# Patient Record
Sex: FEMALE | Race: WHITE | NOT HISPANIC OR LATINO | Employment: UNEMPLOYED | URBAN - METROPOLITAN AREA
[De-identification: names, ages, dates, MRNs, and addresses within clinical notes are randomized per-mention and may not be internally consistent; named-entity substitution may affect disease eponyms.]

---

## 2017-02-15 ENCOUNTER — TELEPHONE (OUTPATIENT)
Dept: FAMILY MEDICINE | Facility: CLINIC | Age: 27
End: 2017-02-15

## 2017-02-15 NOTE — TELEPHONE ENCOUNTER
Panel Management Review      Patient has the following on her problem list:     Depression / Dysthymia review  PHQ-9 SCORE 11/18/2016 12/23/2016   Total Score 22 14      Patient is due for:  None      Composite cancer screening  Chart review shows that this patient is due/due soon for the following None  Summary:    Patient is due/failing the following:   Tetanus Immunization    Action needed:   Patient needs nurse only appointment.    Type of outreach:    Phone, left message for patient to call back.     Questions for provider review:    None                                                                                                                                    Do Alaniz MA     Chart routed to Care Team .

## 2017-03-22 ENCOUNTER — TELEPHONE (OUTPATIENT)
Dept: FAMILY MEDICINE | Facility: CLINIC | Age: 27
End: 2017-03-22

## 2017-03-22 DIAGNOSIS — Z53.9 ERRONEOUS ENCOUNTER--DISREGARD: Primary | ICD-10-CM

## 2017-06-29 ENCOUNTER — OFFICE VISIT (OUTPATIENT)
Dept: FAMILY MEDICINE | Facility: CLINIC | Age: 27
End: 2017-06-29
Payer: COMMERCIAL

## 2017-06-29 VITALS
HEART RATE: 61 BPM | BODY MASS INDEX: 34.98 KG/M2 | OXYGEN SATURATION: 98 % | TEMPERATURE: 97.5 F | DIASTOLIC BLOOD PRESSURE: 60 MMHG | WEIGHT: 210.2 LBS | SYSTOLIC BLOOD PRESSURE: 108 MMHG

## 2017-06-29 DIAGNOSIS — Z23 NEED FOR PROPHYLACTIC VACCINATION WITH TETANUS-DIPHTHERIA (TD): ICD-10-CM

## 2017-06-29 DIAGNOSIS — F33.2 SEVERE EPISODE OF RECURRENT MAJOR DEPRESSIVE DISORDER, WITHOUT PSYCHOTIC FEATURES (H): ICD-10-CM

## 2017-06-29 DIAGNOSIS — N30.00 ACUTE CYSTITIS WITHOUT HEMATURIA: Primary | ICD-10-CM

## 2017-06-29 DIAGNOSIS — Z23 NEED FOR HPV VACCINE: ICD-10-CM

## 2017-06-29 DIAGNOSIS — F41.9 ANXIETY: ICD-10-CM

## 2017-06-29 PROBLEM — F33.9 RECURRENT MAJOR DEPRESSIVE DISORDER (H): Status: ACTIVE | Noted: 2017-06-29

## 2017-06-29 LAB
BACTERIA #/AREA URNS HPF: ABNORMAL /HPF
MUCOUS THREADS #/AREA URNS LPF: PRESENT /LPF
NON-SQ EPI CELLS #/AREA URNS LPF: ABNORMAL /LPF
RBC #/AREA URNS AUTO: ABNORMAL /HPF (ref 0–2)
URNS CMNT MICRO: ABNORMAL
WBC #/AREA URNS AUTO: ABNORMAL /HPF (ref 0–2)

## 2017-06-29 PROCEDURE — 90715 TDAP VACCINE 7 YRS/> IM: CPT | Performed by: FAMILY MEDICINE

## 2017-06-29 PROCEDURE — 81015 MICROSCOPIC EXAM OF URINE: CPT | Performed by: FAMILY MEDICINE

## 2017-06-29 PROCEDURE — 90472 IMMUNIZATION ADMIN EACH ADD: CPT | Performed by: FAMILY MEDICINE

## 2017-06-29 PROCEDURE — 87086 URINE CULTURE/COLONY COUNT: CPT | Performed by: FAMILY MEDICINE

## 2017-06-29 PROCEDURE — 90651 9VHPV VACCINE 2/3 DOSE IM: CPT | Performed by: FAMILY MEDICINE

## 2017-06-29 PROCEDURE — 90471 IMMUNIZATION ADMIN: CPT | Performed by: FAMILY MEDICINE

## 2017-06-29 PROCEDURE — 99214 OFFICE O/P EST MOD 30 MIN: CPT | Mod: 25 | Performed by: FAMILY MEDICINE

## 2017-06-29 RX ORDER — BUPROPION HYDROCHLORIDE 100 MG/1
100 TABLET, EXTENDED RELEASE ORAL EVERY MORNING
Qty: 30 TABLET | Refills: 1 | Status: SHIPPED | OUTPATIENT
Start: 2017-06-29 | End: 2023-01-27

## 2017-06-29 RX ORDER — SULFAMETHOXAZOLE/TRIMETHOPRIM 800-160 MG
1 TABLET ORAL 2 TIMES DAILY
Qty: 6 TABLET | Refills: 0 | Status: SHIPPED | OUTPATIENT
Start: 2017-06-29 | End: 2017-07-02

## 2017-06-29 ASSESSMENT — ANXIETY QUESTIONNAIRES
1. FEELING NERVOUS, ANXIOUS, OR ON EDGE: NEARLY EVERY DAY
IF YOU CHECKED OFF ANY PROBLEMS ON THIS QUESTIONNAIRE, HOW DIFFICULT HAVE THESE PROBLEMS MADE IT FOR YOU TO DO YOUR WORK, TAKE CARE OF THINGS AT HOME, OR GET ALONG WITH OTHER PEOPLE: EXTREMELY DIFFICULT
2. NOT BEING ABLE TO STOP OR CONTROL WORRYING: NEARLY EVERY DAY
GAD7 TOTAL SCORE: 21
3. WORRYING TOO MUCH ABOUT DIFFERENT THINGS: NEARLY EVERY DAY
7. FEELING AFRAID AS IF SOMETHING AWFUL MIGHT HAPPEN: NEARLY EVERY DAY
5. BEING SO RESTLESS THAT IT IS HARD TO SIT STILL: NEARLY EVERY DAY
6. BECOMING EASILY ANNOYED OR IRRITABLE: NEARLY EVERY DAY

## 2017-06-29 ASSESSMENT — PATIENT HEALTH QUESTIONNAIRE - PHQ9: 5. POOR APPETITE OR OVEREATING: NEARLY EVERY DAY

## 2017-06-29 NOTE — MR AVS SNAPSHOT
"              After Visit Summary   6/29/2017    Veronique Oliva    MRN: [id number]           Patient Information     Date Of Birth          1990        Visit Information        Provider Department      6/29/2017 5:20 PM Jace Graf MD [facility]        Today's Diagnoses     Acute cystitis without hematuria    -  1    Severe episode of recurrent major depressive disorder, without psychotic features (H)        Anxiety        Need for HPV vaccine        Need for prophylactic vaccination with tetanus-diphtheria (TD)           Follow-ups after your visit        Follow-up notes from your care team     Return in about 3 weeks (around 7/20/2017) for recheck depression, recheck medications.      Who to contact     If you have questions or need follow up information about today's clinic visit or your schedule please contact [facility] directly at [phone].  Normal or non-critical lab and imaging results will be communicated to you by MyChart, letter or phone within 4 business days after the clinic has received the results. If you do not hear from us within 7 days, please contact the clinic through MyChart or phone. If you have a critical or abnormal lab result, we will notify you by phone as soon as possible.  Submit refill requests through [facility] or call your pharmacy and they will forward the refill request to us. Please allow 3 business days for your refill to be completed.          Additional Information About Your Visit        MyChart Information     [facility] lets you send messages to your doctor, view your test results, renew your prescriptions, schedule appointments and more. To sign up, go to [email or web address] . Click on \"Log in\" on the left side of the screen, which will take you to the Welcome page. Then click on \"Sign up Now\" on the right side of the page.     You will be asked to enter the access code listed below, as well as some personal " information. Please follow the directions to create your username and password.     Your access code is: [email or web address]  Expires: 2017  6:16 PM     Your access code will  in 90 days. If you need help or a new code, please call your [place] clinic or [phone].        Care EveryWhere ID     This is your Care EveryWhere ID. This could be used by other organizations to access your [place] medical records  [id number]        Your Vitals Were     Pulse Temperature Pulse Oximetry BMI (Body Mass Index)          61 97.5  F (36.4  C) (Oral) 98% 34.98 kg/m2         Blood Pressure from Last 3 Encounters:   17 108/60   16 106/72   16 118/78    Weight from Last 3 Encounters:   17 210 lb 3.2 oz (95.3 kg)   16 229 lb (103.9 kg)   16 230 lb 9.6 oz (104.6 kg)              We Performed the Following     DEPRESSION ACTION PLAN (DAP)     HC HPV VAC 9V 3 DOSE IM     TDAP VACCINE (ADACEL)     Urine Culture Aerobic Bacterial     Urine Microscopic          Today's Medication Changes          These changes are accurate as of: 17  6:16 PM.  If you have any questions, ask your nurse or doctor.               Start taking these medicines.        Dose/Directions    buPROPion 100 MG 12 hr tablet   Commonly known as:  WELLBUTRIN SR   Used for:  Anxiety   Started by:  Jace Graf MD        Dose:  100 mg   Take 1 tablet (100 mg) by mouth every morning   Quantity:  30 tablet   Refills:  1       sulfamethoxazole-trimethoprim 800-160 MG per tablet   Commonly known as:  BACTRIM DS/SEPTRA DS   Used for:  Acute cystitis without hematuria   Started by:  Jace Graf MD        Dose:  1 tablet   Take 1 tablet by mouth 2 times daily for 3 days for urinary infection.   Quantity:  6 tablet   Refills:  0         These medicines have changed or have updated prescriptions.        Dose/Directions    sertraline 50 MG tablet   Commonly known as:  ZOLOFT   This may have changed:    - how much to  take  - how to take this  - when to take this  - additional instructions   Used for:  Anxiety, Severe episode of recurrent major depressive disorder, without psychotic features (H)   Changed by:  Jace Graf MD        Take 1/2 tab (25 mg) for 1 week, then 1 tab (50 mg) for 1-2 weeks, then 2 tabs (100 mg) daily, for depression and anxiety prevention.   Quantity:  60 tablet   Refills:  1         Stop taking these medicines if you haven't already. Please contact your care team if you have questions.     BUSPAR PO   Stopped by:  Jace Graf MD                Where to get your medicines      These medications were sent to [place], [place] - [facility] [address] [facility]  [address] [facility] [address]     Phone:  [phone]     buPROPion 100 MG 12 hr tablet    sertraline 50 MG tablet    sulfamethoxazole-trimethoprim 800-160 MG per tablet                Primary Care Provider Fax #    [place] [phone] [address] [facility] [address]        Equal Access to Services     [facility]: Hadii aaron ku hadasho Soomaali, waaxda luqadaha, qaybta kaalmada adeegyababar, waxhugo malone . So [facility] [phone].    ATENCIÓN: Si habla español, tiene a mayer disposición servicios gratuitos de asistencia lingüística. Luxame al [phone].    We comply with applicable federal civil rights laws and [place] laws. We do not discriminate on the basis of race, color, national origin, age, disability sex, sexual orientation or gender identity.            Thank you!     Thank you for choosing [facility]  for your care. Our goal is always to provide you with excellent care. Hearing back from our patients is one way we can continue to improve our services. Please take a few minutes to complete the written survey that you may receive in the mail after your visit with us. Thank you!             Your Updated Medication List -  Protect others around you: Learn how to safely use, store and throw away your medicines at www.disposemymeds.org.          This list is accurate as of: 6/29/17  6:16 PM.  Always use your most recent med list.                   Brand Name Dispense Instructions for use Diagnosis    buPROPion 100 MG 12 hr tablet    WELLBUTRIN SR    30 tablet    Take 1 tablet (100 mg) by mouth every morning    Anxiety       sertraline 50 MG tablet    ZOLOFT    60 tablet    Take 1/2 tab (25 mg) for 1 week, then 1 tab (50 mg) for 1-2 weeks, then 2 tabs (100 mg) daily, for depression and anxiety prevention.    Anxiety, Severe episode of recurrent major depressive disorder, without psychotic features (H)       sulfamethoxazole-trimethoprim 800-160 MG per tablet    BACTRIM DS/SEPTRA DS    6 tablet    Take 1 tablet by mouth 2 times daily for 3 days for urinary infection.    Acute cystitis without hematuria

## 2017-06-29 NOTE — NURSING NOTE
"Chief Complaint   Patient presents with     RECHECK     anxiety, depression     Urinary Problem       Initial /60 (BP Location: Left arm, Patient Position: Chair, Cuff Size: Adult Large)  Pulse 61  Temp 97.5  F (36.4  C) (Oral)  Wt 210 lb 3.2 oz (95.3 kg)  SpO2 98%  BMI 34.98 kg/m2 Estimated body mass index is 34.98 kg/(m^2) as calculated from the following:    Height as of 12/23/16: 5' 5\" (1.651 m).    Weight as of this encounter: 210 lb 3.2 oz (95.3 kg).  Medication Reconciliation: complete   Ira Sanches CMA      "

## 2017-06-29 NOTE — PROGRESS NOTES
SUBJECTIVE:                                                    Veronique Oliva is a 26 year old female who presents to clinic today for the following health issues:  She is accompanied by her close friend (ex-boyfriend).   Depression and Anxiety Follow-Up    Status since last visit: Worsened     Other associated symptoms: clinching jaw, has to move hands, bites fingernails    Complicating factors:     Significant life event: 3 people she is close to  in last few months (2 of OD, uncle of cancer)     Current substance abuse: None    PHQ-9 SCORE 2016   Total Score 22 14     MARCO ANTONIO-7 SCORE 2016   Total Score 19 15       PHQ-9  English  PHQ-9   Any Language  GAD7    Amount of exercise or physical activity: Walks a lot at work    Problems taking medications regularly: No    Medication side effects: none    Diet: regular (no restrictions)  Was not able to take Sertraline and Buspirone for 3 months- was out of prescription. Buspirone did not help with symptoms.    URINARY TRACT SYMPTOMS  Onset: 1 week ago    Description:   Painful urination (Dysuria): YES- constant pain not just when  Blood in urine (Hematuria): no   Delay in urine (Hesitency): YES    Intensity: moderate    Progression of Symptoms:  worsening    Accompanying Signs & Symptoms:  Fever/chills: YES- yesterday had fever, chills yesterday  Flank pain YES  Nausea and vomiting: YES- nausea  Any vaginal symptoms: vaginal discharge- orange not sure if it's more than usual  Abdominal/Pelvic Pain: YES    History:   History of frequent UTI's: YES  History of kidney stones: no   Sexually Active: YES  Possibility of pregnancy: Don't Know  Therapies Tried and outcome: Azo- helps slightly    Patient comes in for possible UTI and to talk about her depression and anxiety. She stopped alcohol and other drugs, but does smoke marijuana.     She states her sertraline helps, but the anxiety meds don't.     Past medical, family, and  social histories, medications, and allergies are reviewed and updated in Epic.     ROS:  C: NEGATIVE for fever, chills, change in weight  E/M: NEGATIVE for ear, mouth and throat problems  R: NEGATIVE for significant cough or SOB  CV: NEGATIVE for chest pain, palpitations or peripheral edema  PSYCHIATRIC: She does have frequent thoughts of death and wanting to die, due to feeling overwhelmed by recent stressors, but has no plans to kill herself; PHQ-9 = 26; MARCO ANTONIO-7 = 21. She states that she is sober except for occasional marijuana use.  ROS otherwise negative    Any history above obtained by the Medical Assistant was reviewed by Dr. Jace Graf MD, and edited when necessary.    This document serves as a record of the services and decisions personally performed and made by Dr. Graf. It was created on his behalf by Urmila Jeong, a trained medical scribe. The creation of this document is based the provider's statements to the medical scribe.  Urmila Jeong June 29, 2017 6:02 PM     OBJECTIVE:                                                    /60 (BP Location: Left arm, Patient Position: Chair, Cuff Size: Adult Large)  Pulse 61  Temp 97.5  F (36.4  C) (Oral)  Wt 95.3 kg (210 lb 3.2 oz)  SpO2 98%  BMI 34.98 kg/m2   Body mass index is 34.98 kg/(m^2).     EXAM:  GENERAL: healthy, alert and no distress  EYES: Eyes grossly normal to inspection, PERRL, EOMI, sclerae white and conjunctivae normal  MS: no gross musculoskeletal defects noted, no edema  SKIN: no suspicious lesions or rashes  NEURO: Normal strength and tone, sensory exam grossly normal, mentation intact, oriented times 3 and cranial nerves 2-12 intact  PSYCH: mentation appears normal, affect normal, she is not suicidal    Diagnostic Test Results:  none      ASSESSMENT/PLAN:                                                      (N30.00) Acute cystitis without hematuria  (primary encounter diagnosis)  Comment: WBC greater than RBC on micro,  with classic symptoms.  Plan: Urine Microscopic,         sulfamethoxazole-trimethoprim (BACTRIM         DS/SEPTRA DS) 800-160 MG per tablet, CANCELED:         UA reflex to Microscopic and Culture        Follow up as needed.     (F33.2) Severe episode of recurrent major depressive disorder, without psychotic features (H)  Comment: She has tolerated sertraline in the past but has not taken more than 50 mg. I think she would do better on a higher dose.  Plan: sertraline (ZOLOFT) 50 MG tablet, DEPRESSION         ACTION PLAN (DAP)        Follow up in 3-4 weeks.     (F41.9) Anxiety  Comment: Patient reports minimal effect of Buspirone, so I will not bother to restart that. Perhaps the bupropion will be helpful.   Plan: sertraline (ZOLOFT) 50 MG tablet, buPROPion         (WELLBUTRIN SR) 100 MG 12 hr tablet        Follow up in 3-4 weeks.     (Z23) Need for HPV vaccine  Comment: #1  Plan: HC HPV VAC 9V 3 DOSE IM        Next dose in 4-8 weeks.     (Z23) Need for prophylactic vaccination with tetanus-diphtheria (TD)  Comment:   Plan: TDAP VACCINE (ADACEL)              The information in this document, created by the medical scribe for me, accurately reflects the services I personally performed and the decisions made by me. I have reviewed and approved this document for accuracy prior to leaving the patient care area. June 29, 2017 6:02 PM   Jace Graf MD

## 2017-06-29 NOTE — LETTER
July 3, 2017      Veronique Joanie Oliva  [address] [place] [address]          Dear Ms. Oliva,    Your urine culture was negative. Complete the medication as prescribed and if you experience new, worsening or persistent symptoms, you should call or return for a recheck.    Please contact the clinic if you have additional questions.  Thank you.    Sincerely,      Jace Graf MD/park    Results for orders placed or performed in visit on 06/29/17   Urine Microscopic   Result Value Ref Range    WBC Urine 10-25 (A) 0 - 2 /HPF    RBC Urine 5-10 (A) 0 - 2 /HPF    Squamous Epithelial /LPF Urine Few FEW /LPF    Bacteria Urine Many (A) NEG /HPF    Mucous Urine Present (A) NEG /LPF    Comment Urine Interfering substances, dipstick not done    Urine Culture Aerobic Bacterial   Result Value Ref Range    Specimen Description Midstream Urine     Culture Micro No growth     Micro Report Status FINAL 06/30/2017

## 2017-06-29 NOTE — LETTER
My Depression Action Plan  Name: Veronique Oliva   Date of Birth 1990  Date: 6/29/2017    My doctor: [facility]   My clinic: [address] [id number] [phone]          GREEN    ZONE   Good Control    What it looks like:     Things are going generally well. You have normal up s and down s. You may even feel depressed from time to time, but bad moods usually last less than a day.   What you need to do:  1. Continue to care for yourself (see self care plan)  2. Check your depression survival kit and update it as needed  3. Follow your physician s recommendations including any medication.  4. Do not stop taking medication unless you consult with your physician first.           YELLOW         ZONE Getting Worse    What it looks like:     Depression is starting to interfere with your life.     It may be hard to get out of bed; you may be starting to isolate yourself from others.    Symptoms of depression are starting to last most all day and this has happened for several days.     You may have suicidal thoughts but they are not constant.   What you need to do:     1. Call your care team, your response to treatment will improve if you keep your care team informed of your progress. Yellow periods are signs an adjustment may need to be made.     2. Continue your self-care, even if you have to fake it!    3. Talk to someone in your support network    4. Open up your depression survival kit           RED    ZONE Medical Alert - Get Help    What it looks like:     Depression is seriously interfering with your life.     You may experience these or other symptoms: You can t get out of bed most days, can t work or engage in other necessary activities, you have trouble taking care of basic hygiene, or basic responsibilities, thoughts of suicide or death that will not go away, self-injurious behavior.     What you need to  do:  1. Call your care team and request a same-day appointment. If they are not available (weekends or after hours) call your local crisis line, emergency room or 911.      Electronically signed by: Jean-Paul Kearns, June 29, 2017    Depression Self Care Plan / Survival Kit    Self-Care for Depression  Here s the deal. Your body and mind are really not as separate as most people think.  What you do and think affects how you feel and how you feel influences what you do and think. This means if you do things that people who feel good do, it will help you feel better.  Sometimes this is all it takes.  There is also a place for medication and therapy depending on how severe your depression is, so be sure to consult with your medical provider and/ or Behavioral Health Consultant if your symptoms are worsening or not improving.     In order to better manage my stress, I will:    Exercise  Get some form of exercise, every day. This will help reduce pain and release endorphins, the  feel good  chemicals in your brain. This is almost as good as taking antidepressants!  This is not the same as joining a gym and then never going! (they count on that by the way ) It can be as simple as just going for a walk or doing some gardening, anything that will get you moving.      Hygiene   Maintain good hygiene (Get out of bed in the morning, Make your bed, Brush your teeth, Take a shower, and Get dressed like you were going to work, even if you are unemployed).  If your clothes don't fit try to get ones that do.    Diet  I will strive to eat foods that are good for me, drink plenty of water, and avoid excessive sugar, caffeine, alcohol, and other mood-altering substances.  Some foods that are helpful in depression are: complex carbohydrates, B vitamins, flaxseed, fish or fish oil, fresh fruits and vegetables.    Psychotherapy  I agree to participate in Individual Therapy (if recommended).    Medication  If prescribed medications, I  agree to take them.  Missing doses can result in serious side effects.  I understand that drinking alcohol, or other illicit drug use, may cause potential side effects.  I will not stop my medication abruptly without first discussing it with my provider.    Staying Connected With Others  I will stay in touch with my friends, family members, and my primary care provider/team.    Use your imagination  Be creative.  We all have a creative side; it doesn t matter if it s oil painting, sand castles, or mud pies! This will also kick up the endorphins.    Witness Beauty  (AKA stop and smell the roses) Take a look outside, even in mid-winter. Notice colors, textures. Watch the squirrels and birds.     Service to others  Be of service to others.  There is always someone else in need.  By helping others we can  get out of ourselves  and remember the really important things.  This also provides opportunities for practicing all the other parts of the program.    Humor  Laugh and be silly!  Adjust your TV habits for less news and crime-drama and more comedy.    Control your stress  Try breathing deep, massage therapy, biofeedback, and meditation. Find time to relax each day.     My support system    Clinic Contact:  Phone number:    Contact 1:  Phone number:    Contact 2:  Phone number:    Christian/:  Phone number:    Therapist:  Phone number:    Local crisis center:    Phone number:    Other community support:  Phone number:

## 2017-06-30 LAB
BACTERIA SPEC CULT: NO GROWTH
MICRO REPORT STATUS: NORMAL
SPECIMEN SOURCE: NORMAL

## 2017-06-30 ASSESSMENT — ANXIETY QUESTIONNAIRES: GAD7 TOTAL SCORE: 21

## 2017-06-30 ASSESSMENT — PATIENT HEALTH QUESTIONNAIRE - PHQ9: SUM OF ALL RESPONSES TO PHQ QUESTIONS 1-9: 26

## 2017-06-30 NOTE — NURSING NOTE
Screening Questionnaire for Adult Immunization    Are you sick today?   No   Do you have allergies to medications, food, a vaccine component or latex?   No   Have you ever had a serious reaction after receiving a vaccination?   No   Do you have a long-term health problem with heart disease, lung disease, asthma, kidney disease, metabolic disease (e.g. diabetes), anemia, or other blood disorder?   No   Do you have cancer, leukemia, HIV/AIDS, or any other immune system problem?   No   In the past 3 months, have you taken medications that affect  your immune system, such as prednisone, other steroids, or anticancer drugs; drugs for the treatment of rheumatoid arthritis, Crohn s disease, or psoriasis; or have you had radiation treatments?   No   Have you had a seizure, or a brain or other nervous system problem?   No   During the past year, have you received a transfusion of blood or blood     products, or been given immune (gamma) globulin or antiviral drug?   No   For women: Are you pregnant or is there a chance you could become        pregnant during the next month?   No   Have you received any vaccinations in the past 4 weeks?   No     Immunization questionnaire answers were all negative.      [facility] does apply for the following reason:  Insured: Has insurance that covers the cost of all vaccines (Not [facility] elligible because insurance already covers all vaccines)    Per orders of Dr. Graf , injection of Tdap and HPV given by Tamica Butler. Patient instructed to remain in clinic for 20 minutes afterwards, and to report any adverse reaction to me immediately.       Screening performed by Tamica Butler on 6/29/2017 at 7:19 PM.

## 2017-08-01 ENCOUNTER — TRANSFERRED RECORDS (OUTPATIENT)
Dept: HEALTH INFORMATION MANAGEMENT | Facility: CLINIC | Age: 27
End: 2017-08-01

## 2017-08-03 ENCOUNTER — OFFICE VISIT (OUTPATIENT)
Dept: FAMILY MEDICINE | Facility: CLINIC | Age: 27
End: 2017-08-03
Payer: COMMERCIAL

## 2017-08-03 VITALS
OXYGEN SATURATION: 99 % | SYSTOLIC BLOOD PRESSURE: 116 MMHG | DIASTOLIC BLOOD PRESSURE: 72 MMHG | HEART RATE: 60 BPM | BODY MASS INDEX: 33.95 KG/M2 | TEMPERATURE: 97.9 F | WEIGHT: 204 LBS

## 2017-08-03 DIAGNOSIS — M76.891 HIP FLEXOR TENDONITIS, RIGHT: Primary | ICD-10-CM

## 2017-08-03 DIAGNOSIS — M25.552 HIP PAIN, LEFT: ICD-10-CM

## 2017-08-03 PROCEDURE — 99214 OFFICE O/P EST MOD 30 MIN: CPT | Performed by: FAMILY MEDICINE

## 2017-08-03 RX ORDER — PREDNISONE 20 MG/1
TABLET ORAL
Refills: 0 | COMMUNITY
Start: 2017-08-01 | End: 2023-01-27

## 2017-08-03 RX ORDER — OXYCODONE AND ACETAMINOPHEN 5; 325 MG/1; MG/1
1-2 TABLET ORAL
COMMUNITY
Start: 2016-09-01 | End: 2023-01-27

## 2017-08-03 NOTE — MR AVS SNAPSHOT
After Visit Summary   8/3/2017    Veronique Oliva    MRN: [id number]           Patient Information     Date Of Birth          1990        Visit Information        Provider Department      8/3/2017 1:40 PM Jace Graf MD [facility] [place]        Today's Diagnoses     Hip flexor tendonitis, right    -  1    Hip pain, left           Follow-ups after your visit        Additional Services     [facility] PT, HAND, AND CHIROPRACTIC REFERRAL       **This order will print in the [facility] Scheduling Office**    Physical Therapy, Hand Therapy and Chiropractic Care are available through:    *[facility]  *[facility]  *[facility]    Call one number to schedule at any of the above locations: [phone] or go to [email or web address].    Your provider has referred you to: Physical Therapy at [facility] or [facility]    Indication/Reason for Referral: Hip Pain  Onset of Illness: 2 months with acute exacerbation  Therapy Orders: Evaluate and Treat  Special Programs: None  Special Request: Equipment: As Indicated:   Special Request: Modalities: As Indicated:     Additional Comments for the Therapist or Chiropractor:     Please be aware that coverage of these services is subject to the terms and limitations of your health insurance plan.  Call member services at your health plan with any benefit or coverage questions.      Please bring the following to your appointment:    *Your personal calendar for scheduling future appointments  *Comfortable clothing                  Follow-up notes from your care team     Return in about 4 weeks (around 8/31/2017) for recheck if symptoms fail to resolve by then.      Who to contact     If you have questions or need follow up information about today's clinic visit or your schedule please contact [facility] directly at [phone].  Normal or non-critical lab and imaging results will be  "communicated to you by SpineFormhart, letter or phone within 4 business days after the clinic has received the results. If you do not hear from us within 7 days, please contact the clinic through [employer]anchik or phone. If you have a critical or abnormal lab result, we will notify you by phone as soon as possible.  Submit refill requests through Kabanchik or call your pharmacy and they will forward the refill request to us. Please allow 3 business days for your refill to be completed.          Additional Information About Your Visit        Kabanchik Information     Kabanchik lets you send messages to your doctor, view your test results, renew your prescriptions, schedule appointments and more. To sign up, go to [email or web address] . Click on \"Log in\" on the left side of the screen, which will take you to the Welcome page. Then click on \"Sign up Now\" on the right side of the page.     You will be asked to enter the access code listed below, as well as some personal information. Please follow the directions to create your username and password.     Your access code is: [email or web address]  Expires: 2017  6:16 PM     Your access code will  in 90 days. If you need help or a new code, please call your [facility] clinic or [phone].        Care EveryWhere ID     This is your Care EveryWhere ID. This could be used by other organizations to access your [facility] medical records  [id number]        Your Vitals Were     Pulse Temperature Pulse Oximetry Breastfeeding? BMI (Body Mass Index)       60 97.9  F (36.6  C) (Oral) 99% No 33.95 kg/m2        Blood Pressure from Last 3 Encounters:   17 116/72   17 108/60   16 106/72    Weight from Last 3 Encounters:   17 204 lb (92.5 kg)   17 210 lb 3.2 oz (95.3 kg)   16 229 lb (103.9 kg)              We Performed the Following     [facility] PT, HAND, AND CHIROPRACTIC REFERRAL        Primary Care Provider Office Phone # Fax #    Jace Graf [phone] " [phone]       [facility] [address]  [facility] [address]        Equal Access to Services     JOHNATHON IVEY : Hadii aad ku hadjorge albertoxavi Somadhu, waaxda luqadaha, qaybta kaalmada adedebda, elyssa claudiain hayaan jostinnaldo mckinney lanatalieamish yulia. So [facility] [phone].    ATENCIÓN: Si habla español, tiene a mayer disposición servicios gratuitos de asistencia lingüística. Llame al [phone].    We comply with applicable federal civil rights laws and [place] laws. We do not discriminate on the basis of race, color, national origin, age, disability sex, sexual orientation or gender identity.            Thank you!     Thank you for choosing [facility]  for your care. Our goal is always to provide you with excellent care. Hearing back from our patients is one way we can continue to improve our services. Please take a few minutes to complete the written survey that you may receive in the mail after your visit with us. Thank you!             Your Updated Medication List - Protect others around you: Learn how to safely use, store and throw away your medicines at www.disposemymeds.org.          This list is accurate as of: 8/3/17  2:38 PM.  Always use your most recent med list.                   Brand Name Dispense Instructions for use Diagnosis    buPROPion 100 MG 12 hr tablet    WELLBUTRIN SR    30 tablet    Take 1 tablet (100 mg) by mouth every morning    Anxiety       oxyCODONE-acetaminophen 5-325 MG per tablet    PERCOCET     Take 1-2 tablets by mouth        predniSONE 20 MG tablet    DELTASONE          sertraline 50 MG tablet    ZOLOFT    60 tablet    Take 1/2 tab (25 mg) for 1 week, then 1 tab (50 mg) for 1-2 weeks, then 2 tabs (100 mg) daily, for depression and anxiety prevention.    Anxiety, Severe episode of recurrent major depressive disorder, without psychotic features (H)

## 2017-08-03 NOTE — PROGRESS NOTES
SUBJECTIVE:                                                    Veronique Oliva is a 26 year old female who presents to clinic today for the following health issues:      ED/UC Followup:    Facility:  [facility]   Date of visit: 8/1/2017  Reason for visit: Low back pain  Current Status: Still having pain     Back Pain       Duration: Follow up Urgent Care Visit        Specific cause: none    Description:   Location of pain: low back both  Character of pain: dull ache, stabbing, cramping and constant  Pain radiation:radiates into the right buttocks, radiates into the right leg, radiates into the right foot, radiates into the left buttocks, radiates into the left leg, radiates into the left foot and sometimes also goes to the middle back  New numbness or weakness in legs, not attributed to pain:  YES    Intensity: Currently 8/10, At its worst 10/10, severe    History:   Pain interferes with job: Not applicable  History of back problems: recurrent self limited episodes of low back pain in the past  Any previous MRI or X-rays: None  Sees a specialist for back pain:  No  Therapies tried without relief: na    Alleviating factors:   Improved by: na      Precipitating factors:  Worsened by: Lifting, Bending, Standing, Sitting, Lying Flat, Walking and Coughing    Functional and Psychosocial Screen ([place] STarT Back):      Not performed today      Accompanying Signs & Symptoms:  Risk of Fracture:  None  Risk of Cauda Equina:  None  Risk of Infection:  None  Risk of Cancer:  None  Risk of Ankylosing Spondylitis:  Onset at age <35, male, AND morning back stiffness. no     A couple months ago, patient states she started to feel pain in her right groin crease, and now the pain has moved to her back and both legs. She cannot lift her right leg on its own; she has to move it with her arms. Previously, the pain only occurred when she moved, but now the pain is constant.     She went to  for this issue,  and patient was told the issue was muscle related and PT was recommended.     She denies any injury, but she does use her legs often (pushing heavy objects, playing with her dog) and she suspects this may be contributing to her current symptoms.    Past medical, family, and social histories, medications, and allergies are reviewed and updated in Epic.     ROS:  C: NEGATIVE for fever, chills, change in weight  E/M: NEGATIVE for ear, mouth and throat problems  R: NEGATIVE for significant cough or SOB  CV: NEGATIVE for chest pain, palpitations or peripheral edema  ROS otherwise negative    Any history above obtained by the Medical Assistant was reviewed by Dr. Jace Graf MD, and edited when necessary.    This document serves as a record of the services and decisions personally performed and made by Dr. Graf. It was created on his behalf by Urmila Jeong, a trained medical scribe. The creation of this document is based the provider's statements to the medical scribe.  Urmila Jeong August 3, 2017 2:27 PM     OBJECTIVE:                                                    /72 (BP Location: Right arm, Patient Position: Chair, Cuff Size: Adult Large)  Pulse 60  Temp 97.9  F (36.6  C) (Oral)  Wt 92.5 kg (204 lb)  SpO2 99%  Breastfeeding? No  BMI 33.95 kg/m2   Body mass index is 33.95 kg/(m^2).     GENERAL: healthy, alert and no distress  EYES: Eyes grossly normal to inspection, PERRL, EOMI, sclerae white and conjunctivae normal  MS: Strength testing of the muscles of the hip joints shows the most reproduction of pain with resisted hip flexion right > left, and less so with resisted adduction > abduction of both thighs. If she works through the pain, she appears to have full strength. no gross musculoskeletal defects noted, no edema  SKIN: no suspicious lesions or rashes  NEURO: Normal strength and tone, sensory exam grossly normal, mentation intact, oriented times 3 and cranial nerves 2-12  intact  PSYCH: mentation appears normal, affect normal/bright     Diagnostic Test Results:  none      ASSESSMENT/PLAN:                                                      (M76.237) Hip flexor tendonitis, right  (primary encounter diagnosis)  (M25.552) Hip pain, left  Comment: This is likely an overuse injury that is hopefully self-limited. I think she would benefit from an NSAID, but these are not tolerated.   Plan: [facility] PT, HAND, AND CHIROPRACTIC REFERRAL        Follow up in 4 weeks if physical therapy has not improved her symptoms by then (consider x-ray and/or ortho referral).      The information in this document, created by the medical scribe for me, accurately reflects the services I personally performed and the decisions made by me. I have reviewed and approved this document for accuracy prior to leaving the patient care area. August 3, 2017 2:27 PM   Jace Graf MD

## 2017-08-07 ENCOUNTER — THERAPY VISIT (OUTPATIENT)
Dept: PHYSICAL THERAPY | Facility: CLINIC | Age: 27
End: 2017-08-07
Payer: COMMERCIAL

## 2017-08-07 DIAGNOSIS — M54.50 CHRONIC BILATERAL LOW BACK PAIN WITHOUT SCIATICA: ICD-10-CM

## 2017-08-07 DIAGNOSIS — M25.551 HIP PAIN, RIGHT: Primary | ICD-10-CM

## 2017-08-07 DIAGNOSIS — G89.29 CHRONIC BILATERAL LOW BACK PAIN WITHOUT SCIATICA: ICD-10-CM

## 2017-08-07 PROCEDURE — 97112 NEUROMUSCULAR REEDUCATION: CPT | Mod: GP | Performed by: PHYSICAL THERAPIST

## 2017-08-07 PROCEDURE — 97110 THERAPEUTIC EXERCISES: CPT | Mod: GP | Performed by: PHYSICAL THERAPIST

## 2017-08-07 PROCEDURE — 97162 PT EVAL MOD COMPLEX 30 MIN: CPT | Mod: GP | Performed by: PHYSICAL THERAPIST

## 2017-08-07 NOTE — PROGRESS NOTES
Subjective:    Patient is a 26 year old female presenting with rehab left ankle/foot hpi.                                      Pertinent medical history includes:  Overweight, smoking, mental illness, depression and chemical dependency.  Medical allergies: no.  Other surgeries include:  None reported.  Current medications:  Pain medication, muscle relaxants and steroids.                                      Objective:    System    Physical Exam    General     ROS    Assessment/Plan:

## 2017-08-07 NOTE — PROGRESS NOTES
Subjective:    Patient is a 26 year old female presenting with rehab right hip hpi.   Veronique Oliva is a 26 year old female with a bilateral hips (R>L) condition.  Condition occurred with:  Insidious onset.  Condition occurred: for unknown reasons.  This is a chronic and new condition  Pt presents to clinic with complaints of bilateral hip pain (R>L) and low back pain starting 3 months ago. Pt reports having difficulty lifting legs/hips at times to get into car or into bed. Pt also having difficulty with prolonged walking and stairs. Pt had MD appointment on 8/3/17 and referred to PT.  .    Patient reports pain:  Groin and lateral (bilateral low back).  Radiates to:  Knee.  Pain is described as stabbing and aching and is constant and reported as 4/10 and 10/10.  Associated symptoms:  Loss of motion/stiffness and loss of strength. Pain is the same all the time.  Symptoms are exacerbated by certain positions, walking, standing and weight bearing (transitional movements) and relieved by rest, activity/movement and analgesics.  Since onset symptoms are gradually worsening.  Special testing: none.  Previous treatment: none.    General health as reported by patient is poor.                  Barriers include:  None as reported by the patient.    Red flags:  None as reported by the patient.                        Objective:    Standing Alignment:    Cervical/Thoracic:  Forward head  Shoulder/UE:  Rounded shoulders                             Lumbar/SI Evaluation  ROM:    AROM Lumbar:   Flexion:            To mid tibia +pain  Ext:                    50% +pain low back   Side Bend:        Left:  50%    Right:  50%  Rotation:           Left:  50%    Right:  50%  Side Glide:        Left:     Right:           Lumbar Myotomes:  normal                  Neural Tension/Mobility:      Left side:SLR; Femoral Nerve or Slump  negative.     Right side:   Femoral Nerve; Slump or SLR  negative.   Lumbar Palpation:    Tenderness  present at Left:    Erector Spinae and PSIS  Tenderness present at Right: Erector Spinae and PSIS    Lumbar Provocation:      Left negative with:  PROM hip  Right positive with: PROM hip                                      Hip Evaluation  Hip PROM:  Hip PROM:  Left Hip:    Normal  Right Hip:  Normal                          Hip Strength:    Flexion:   Left: 5-/5   Pain:  Right: 4+/5   +  Pain:                    Extension:  Left: 4+/5  Pain:Right: 4/5    Pain:    Abduction:  Left: 4+/5     Pain:Right: 4/5    Pain:                  Hip Special Testing:    Left hip positive for the following special tests:  Jessie and Fadir/Labrum  Left hip negative for the following special tests:  SLR   Right hip positive for the following special tests:  Jessie and Fadir/LabrumRight hip negative for the following special tests:  SLR    Hip Palpation:    Left hip tenderness present at:   hip flexors; Piriformis and Bursa  Right hip tenderness present at:  hip flexors; Piriformis and Bursa             [facility] Initial Evaluation    Assessment/Plan:      Patient is a 26 year old female with both sides hip complaints.    Patient has the following significant findings with corresponding treatment plan.                Diagnosis 1:  Bilateral hip pain (R>L), low back pain  Pain -  hot/cold therapy, manual therapy, self management, education and home program  Decreased ROM/flexibility - manual therapy, therapeutic exercise, therapeutic activity and home program  Decreased strength - therapeutic exercise, therapeutic activities and home program  Impaired muscle performance - neuro re-education and home program  Decreased function - therapeutic activities and home program  Impaired posture - neuro re-education, therapeutic activities and home program    Therapy Evaluation Codes:   1) History comprised of:   Personal factors that impact the plan of care:      None.    Comorbidity factors that impact  the plan of care are:      Chemical Dependency, Depression, Mental illness, Overweight and Smoking.     Medications impacting care: Muscle relaxant, Pain and Steroids.  2) Examination of Body Systems comprised of:   Body structures and functions that impact the plan of care:      Hip and Lumbar spine.   Activity limitations that impact the plan of care are:      Bending, Stairs, Standing, Walking and Laying down.  3) Clinical presentation characteristics are:   Evolving/Changing.  4) Decision-Making    Moderate complexity using standardized patient assessment instrument and/or measureable assessment of functional outcome.  Cumulative Therapy Evaluation is: Moderate complexity.    Previous and current functional limitations:  (See Goal Flow Sheet for this information)    Short term and Long term goals: (See Goal Flow Sheet for this information)     Communication ability:  Patient appears to be able to clearly communicate and understand verbal and written communication and follow directions correctly.  Treatment Explanation - The following has been discussed with the patient:   RX ordered/plan of care  Anticipated outcomes  Possible risks and side effects  This patient would benefit from PT intervention to resume normal activities.   Rehab potential is good.    Frequency:  1 X week, once daily  Duration:  for 8 weeks  Discharge Plan:  Achieve all LTG.  Independent in home treatment program.  Return to previous functional level by discharge.  Reach maximal therapeutic benefit.    Please refer to the daily flowsheet for treatment today, total treatment time and time spent performing 1:1 timed codes.

## 2017-08-16 ENCOUNTER — THERAPY VISIT (OUTPATIENT)
Dept: PHYSICAL THERAPY | Facility: CLINIC | Age: 27
End: 2017-08-16
Payer: COMMERCIAL

## 2017-08-16 DIAGNOSIS — G89.29 CHRONIC BILATERAL LOW BACK PAIN WITHOUT SCIATICA: ICD-10-CM

## 2017-08-16 DIAGNOSIS — M54.50 CHRONIC BILATERAL LOW BACK PAIN WITHOUT SCIATICA: ICD-10-CM

## 2017-08-16 DIAGNOSIS — M25.551 HIP PAIN, RIGHT: ICD-10-CM

## 2017-08-16 PROCEDURE — 97112 NEUROMUSCULAR REEDUCATION: CPT | Mod: GP | Performed by: PHYSICAL THERAPIST

## 2017-08-16 PROCEDURE — 97110 THERAPEUTIC EXERCISES: CPT | Mod: GP | Performed by: PHYSICAL THERAPIST

## 2017-09-07 ENCOUNTER — THERAPY VISIT (OUTPATIENT)
Dept: PHYSICAL THERAPY | Facility: CLINIC | Age: 27
End: 2017-09-07
Payer: COMMERCIAL

## 2017-09-07 DIAGNOSIS — M54.50 CHRONIC BILATERAL LOW BACK PAIN WITHOUT SCIATICA: ICD-10-CM

## 2017-09-07 DIAGNOSIS — G89.29 CHRONIC BILATERAL LOW BACK PAIN WITHOUT SCIATICA: ICD-10-CM

## 2017-09-07 DIAGNOSIS — M25.551 HIP PAIN, RIGHT: ICD-10-CM

## 2017-09-07 PROCEDURE — 97112 NEUROMUSCULAR REEDUCATION: CPT | Mod: GP | Performed by: PHYSICAL THERAPIST

## 2017-09-07 PROCEDURE — 97110 THERAPEUTIC EXERCISES: CPT | Mod: GP | Performed by: PHYSICAL THERAPIST

## 2017-09-14 ENCOUNTER — THERAPY VISIT (OUTPATIENT)
Dept: PHYSICAL THERAPY | Facility: CLINIC | Age: 27
End: 2017-09-14
Payer: COMMERCIAL

## 2017-09-14 DIAGNOSIS — M54.50 CHRONIC BILATERAL LOW BACK PAIN WITHOUT SCIATICA: ICD-10-CM

## 2017-09-14 DIAGNOSIS — M25.551 HIP PAIN, RIGHT: ICD-10-CM

## 2017-09-14 DIAGNOSIS — G89.29 CHRONIC BILATERAL LOW BACK PAIN WITHOUT SCIATICA: ICD-10-CM

## 2017-09-14 PROCEDURE — 97110 THERAPEUTIC EXERCISES: CPT | Mod: GP | Performed by: PHYSICAL THERAPIST

## 2017-09-14 PROCEDURE — 97112 NEUROMUSCULAR REEDUCATION: CPT | Mod: GP | Performed by: PHYSICAL THERAPIST

## 2017-09-29 ENCOUNTER — THERAPY VISIT (OUTPATIENT)
Dept: PHYSICAL THERAPY | Facility: CLINIC | Age: 27
End: 2017-09-29
Payer: COMMERCIAL

## 2017-09-29 DIAGNOSIS — G89.29 CHRONIC BILATERAL LOW BACK PAIN WITHOUT SCIATICA: ICD-10-CM

## 2017-09-29 DIAGNOSIS — M25.551 HIP PAIN, RIGHT: ICD-10-CM

## 2017-09-29 DIAGNOSIS — M54.50 CHRONIC BILATERAL LOW BACK PAIN WITHOUT SCIATICA: ICD-10-CM

## 2017-09-29 PROCEDURE — 97110 THERAPEUTIC EXERCISES: CPT | Mod: GP | Performed by: PHYSICAL THERAPIST

## 2017-09-29 PROCEDURE — 97112 NEUROMUSCULAR REEDUCATION: CPT | Mod: GP | Performed by: PHYSICAL THERAPIST

## 2017-10-05 ENCOUNTER — THERAPY VISIT (OUTPATIENT)
Dept: PHYSICAL THERAPY | Facility: CLINIC | Age: 27
End: 2017-10-05
Payer: COMMERCIAL

## 2017-10-05 DIAGNOSIS — M25.551 HIP PAIN, RIGHT: ICD-10-CM

## 2017-10-05 DIAGNOSIS — G89.29 CHRONIC BILATERAL LOW BACK PAIN WITHOUT SCIATICA: ICD-10-CM

## 2017-10-05 DIAGNOSIS — M54.50 CHRONIC BILATERAL LOW BACK PAIN WITHOUT SCIATICA: ICD-10-CM

## 2017-10-05 PROCEDURE — 97112 NEUROMUSCULAR REEDUCATION: CPT | Mod: GP | Performed by: PHYSICAL THERAPIST

## 2017-10-05 PROCEDURE — 97110 THERAPEUTIC EXERCISES: CPT | Mod: GP | Performed by: PHYSICAL THERAPIST

## 2017-10-13 ENCOUNTER — THERAPY VISIT (OUTPATIENT)
Dept: PHYSICAL THERAPY | Facility: CLINIC | Age: 27
End: 2017-10-13
Payer: COMMERCIAL

## 2017-10-13 DIAGNOSIS — G89.29 CHRONIC BILATERAL LOW BACK PAIN WITHOUT SCIATICA: ICD-10-CM

## 2017-10-13 DIAGNOSIS — M54.50 CHRONIC BILATERAL LOW BACK PAIN WITHOUT SCIATICA: ICD-10-CM

## 2017-10-13 DIAGNOSIS — M25.551 HIP PAIN, RIGHT: ICD-10-CM

## 2017-10-13 PROCEDURE — 97110 THERAPEUTIC EXERCISES: CPT | Mod: GP | Performed by: PHYSICAL THERAPIST

## 2017-10-13 PROCEDURE — 97112 NEUROMUSCULAR REEDUCATION: CPT | Mod: GP | Performed by: PHYSICAL THERAPIST

## 2017-10-26 ENCOUNTER — THERAPY VISIT (OUTPATIENT)
Dept: PHYSICAL THERAPY | Facility: CLINIC | Age: 27
End: 2017-10-26
Payer: COMMERCIAL

## 2017-10-26 DIAGNOSIS — G89.29 CHRONIC BILATERAL LOW BACK PAIN WITHOUT SCIATICA: ICD-10-CM

## 2017-10-26 DIAGNOSIS — M25.551 HIP PAIN, RIGHT: ICD-10-CM

## 2017-10-26 DIAGNOSIS — M54.50 CHRONIC BILATERAL LOW BACK PAIN WITHOUT SCIATICA: ICD-10-CM

## 2017-10-26 PROCEDURE — 97110 THERAPEUTIC EXERCISES: CPT | Mod: GP | Performed by: PHYSICAL THERAPIST

## 2017-10-26 PROCEDURE — 97112 NEUROMUSCULAR REEDUCATION: CPT | Mod: GP | Performed by: PHYSICAL THERAPIST

## 2017-10-26 NOTE — PROGRESS NOTES
Subjective:    HPI  Oswestry Score: 30 %                 Objective:    System    Physical Exam    General     ROS    Assessment/Plan:      DISCHARGE REPORT    Progress reporting period is from 8/7/17 to 10/26/17.     SUBJECTIVE  Subjective: Veronique has noticed more manageable pain over the past month. Overall reports 75% improvement since starting PT. Pt reports having slight set back in symptoms following quick movement last week getting into car. Pt will continue with HEP independently at this time.    Current Pain level: 6/10   Initial Pain level: 7/10   Changes in function: Yes, see goal flow sheet for change in function   Adverse reactions: None;   ,         OBJECTIVE  Objective: Lumbar ROM: flexion to ankle -pain, extension 50% +pain, bilateral SB 75% -pain, bilateral rotation 75% -pain; moderate palpable tenderness R trochanteric bursitis. R hip PROM WNL and -pain      ASSESSMENT/PLAN  Updated problem list and treatment plan: Diagnosis 1:  Low back pain  Pain -  hot/cold therapy, manual therapy, self management, education and home program  Decreased ROM/flexibility - manual therapy, therapeutic exercise, therapeutic activity and home program  Decreased strength - therapeutic exercise, therapeutic activities and home program  Impaired muscle performance - neuro re-education and home program  Decreased function - therapeutic activities and home program  Impaired posture - neuro re-education, therapeutic activities and home program  STG/LTGs have been met or progress has been made towards goals:  Yes (See Goal flow sheet completed today.)  Assessment of Progress: The patient's condition is improving.  Self Management Plans:  Patient has been instructed in a home treatment program.  Patient is independent in a home treatment program.  Patient  has been instructed in self management of symptoms.  I have re-evaluated this patient and find that the nature, scope, duration and intensity of the therapy is appropriate  for the medical condition of the patient.  Veronique continues to require the following intervention to meet STG and LTG's: PT intervention is no longer required to meet STG/LTG.  We will discharge this patient from PT.    Recommendations:  This patient is ready to be discharged from therapy and continue their home treatment program.    Please refer to the daily flowsheet for treatment today, total treatment time and time spent performing 1:1 timed codes.

## 2017-11-30 ENCOUNTER — TELEPHONE (OUTPATIENT)
Dept: FAMILY MEDICINE | Facility: CLINIC | Age: 27
End: 2017-11-30

## 2017-11-30 NOTE — TELEPHONE ENCOUNTER
Panel Management Review        Last Office Visit with this department:     Fail List measure:     Depression / Dysthymia review    Measure:  Needs PHQ-9 score of 4 or less during index window.  Administer PHQ-9 and if score is 5 or more, send encounter to provider for next steps.    5 - 7 month window range:     INDEX 6/29/17  FU START 11/29/17  FU END     1/29/18    PHQ-9 SCORE 11/18/2016 12/23/2016 6/29/2017   Total Score 22 14 26       If PHQ-9 recheck is 5 or more, route to provider for next steps.    Patient is due for:  PHQ9        Patient is due/failing the following:   PHQ9    Action needed:   Patient needs to do PHQ9.    Type of outreach:    Phone, left message for patient to call back.     Questions for provider review:    None                                                                                                                                   PEMA Cagle MA

## 2017-12-05 PROBLEM — M25.551 HIP PAIN, RIGHT: Status: RESOLVED | Noted: 2017-08-07 | Resolved: 2017-12-05

## 2017-12-05 PROBLEM — G89.29 CHRONIC BILATERAL LOW BACK PAIN WITHOUT SCIATICA: Status: RESOLVED | Noted: 2017-08-07 | Resolved: 2017-12-05

## 2017-12-05 PROBLEM — M54.50 CHRONIC BILATERAL LOW BACK PAIN WITHOUT SCIATICA: Status: RESOLVED | Noted: 2017-08-07 | Resolved: 2017-12-05

## 2017-12-19 NOTE — TELEPHONE ENCOUNTER
This writer attempted to contact Veronique on 12/19/17      Reason for call update PHQ-9 and left message to return call.      If patient calls back:   Patient contacted by 1st floor [facility] Team (MA/TC). Inform patient that someone from the team will contact them, document that pt called and route to care team. .        Luke Cagle MA

## 2017-12-28 NOTE — TELEPHONE ENCOUNTER
This writer attempted to contact Veronique on 12/28/17      Reason for call update PHQ-9  and left message to return call.      If patient calls back:   Patient contacted by 1st floor [facility] Team (MA/TC). Inform patient that someone from the team will contact them, document that pt called and route to care team. .        Luke Cagle MA

## 2018-02-03 ENCOUNTER — HEALTH MAINTENANCE LETTER (OUTPATIENT)
Age: 28
End: 2018-02-03

## 2018-02-19 ENCOUNTER — CARE COORDINATION (OUTPATIENT)
Dept: PEDIATRICS | Facility: CLINIC | Age: 28
End: 2018-02-19

## 2018-02-19 NOTE — PROGRESS NOTES
Care Coordination:  Reviewed and routed Medication Administration at School form to school post signature by TYLOR Ledezma, PNP-C. CALOS

## 2019-10-23 ENCOUNTER — OFFICE VISIT (OUTPATIENT)
Dept: URGENT CARE | Facility: URGENT CARE | Age: 29
End: 2019-10-23
Payer: COMMERCIAL

## 2019-10-23 ENCOUNTER — HOSPITAL ENCOUNTER (EMERGENCY)
Facility: CLINIC | Age: 29
Discharge: HOME | End: 2019-10-23
Attending: PHYSICIAN ASSISTANT | Admitting: PHYSICIAN ASSISTANT
Payer: COMMERCIAL

## 2019-10-23 ENCOUNTER — APPOINTMENT (OUTPATIENT)
Dept: GENERAL RADIOLOGY | Facility: CLINIC | Age: 29
End: 2019-10-23
Attending: PHYSICIAN ASSISTANT
Payer: COMMERCIAL

## 2019-10-23 VITALS
TEMPERATURE: 98.3 F | OXYGEN SATURATION: 97 % | SYSTOLIC BLOOD PRESSURE: 141 MMHG | RESPIRATION RATE: 16 BRPM | DIASTOLIC BLOOD PRESSURE: 69 MMHG

## 2019-10-23 VITALS
SYSTOLIC BLOOD PRESSURE: 140 MMHG | OXYGEN SATURATION: 98 % | RESPIRATION RATE: 20 BRPM | DIASTOLIC BLOOD PRESSURE: 82 MMHG | TEMPERATURE: 97.7 F | BODY MASS INDEX: 35.28 KG/M2 | HEART RATE: 90 BPM | WEIGHT: 212 LBS

## 2019-10-23 DIAGNOSIS — S06.0X0A CONCUSSION WITHOUT LOSS OF CONSCIOUSNESS, INITIAL ENCOUNTER: ICD-10-CM

## 2019-10-23 DIAGNOSIS — S00.33XA CONTUSION OF NOSE, INITIAL ENCOUNTER: ICD-10-CM
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
## 2019-10-23 PROCEDURE — 99284 EMERGENCY DEPT VISIT MOD MDM: CPT

## 2019-10-23 PROCEDURE — 70160 X-RAY EXAM OF NASAL BONES: CPT

## 2019-10-23 RX ORDER — ACETAMINOPHEN 500 MG
1000 TABLET ORAL ONCE
Status: DISCONTINUED | OUTPATIENT
Start: 2019-10-23 | End: 2019-10-23 | Stop reason: HOSPADM

## 2019-10-23 RX ORDER — IBUPROFEN 600 MG/1
600 TABLET, FILM COATED ORAL ONCE
Status: DISCONTINUED | OUTPATIENT
Start: 2019-10-23 | End: 2019-10-23 | Stop reason: HOSPADM

## 2019-10-23 ASSESSMENT — ENCOUNTER SYMPTOMS
VOMITING: 0
WOUND: 1
DIZZINESS: 1
NAUSEA: 0
HEADACHES: 1

## 2019-10-23 NOTE — ED AVS SNAPSHOT
[facility] Emergency Department  [address]  [facility] [address]  Phone:  [phone]  Fax:  [phone]                                    Veronique Oliva   MRN: [id number]    Department:  [facility] Emergency Department   Date of Visit:  10/23/2019           After Visit Summary Signature Page    I have received my discharge instructions, and my questions have been answered. I have discussed any challenges I see with this plan with the nurse or doctor.    ..........................................................................................................................................  Patient/Patient Representative Signature      ..........................................................................................................................................  Patient Representative Print Name and Relationship to Patient    ..................................................               ................................................  Date                                   Time    ..........................................................................................................................................  Reviewed by Signature/Title    ...................................................              ..............................................  Date                                               Time          22EPIC Rev 08/18
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
## 2019-10-23 NOTE — ED NOTES
Pt provided with discharge paperwork and educated on recommended follow-up with PCP and concussion clinic. Pt educated on how to manage symptoms at home and when to seek medical attention. Pt voiced understanding and denied any questions at discharge.

## 2019-10-23 NOTE — DISCHARGE INSTRUCTIONS
*No sports or activities that put you at risk for a repeat head injury until you have been without symptoms for 1 week.   *Tylenol or motrin as directed as needed for pain.  *Follow-up with your doctor or concussion clinic for a recheck in 2-3 days.  Follow-up with ENT in 5-7 days for nasal deformity or trouble breathing out of the nose.  *Return if you develop worsening headache, recurrent vomiting, seizures, neurologic changes or become worse in any way.       Discharge Instructions  Concussion    You were seen today for signs of a concussion.  The symptoms will vary, depending on the nature of your injury and your health. You may have: headache, confusion, nausea (feel sick to your stomach), vomiting (throwing up) and problems with memory, concentrating, or sleep. You may feel dizzy, irritable, and tired. Children and teens may need help from their parents, teachers, and coaches to watch for symptoms as they recover.    Generally, every Emergency Department visit should have a follow-up clinic visit with either a primary or a specialty clinic/provider. Please follow-up as instructed by your emergency provider today.     Return to the Emergency Department if:  Your headache gets worse or you start to have a really bad headache even with the recommended treatment plan.   You feel drowsier, have growing confusion, or slurred speech.   You keep repeating yourself.   You have strange behavior or are feeling more irritable.   You have a seizure.   You vomit (throw up) more than once.   You have trouble walking.   You have weakness or numbness.  Your neck pain gets worse.   You have a loss of consciousness.   You have blood for fluid coming from your ears or nose.   You have new symptoms or anything that worries you.     Home Care:  Get lots of rest and get enough sleep at night. Take daytime naps or rest if you feel tired.   Limit physical activity and  thinking  activities. These can make symptoms worse.   Physical  activities include gym, sports, weight training, running, exercise, and heavy lifting.   Thinking activities include homework, class work, job-related work, and screen time (phone, computer, tablet, TV, and video games).   Stick to a healthy diet and drink lots of fluids. Avoid alcohol.  As symptoms improve, you may slowly return to your daily activities. If symptoms get worse or return, reduce your activity.   Know that it is normal to feel sad or frustrated when you do not feel right and are less active.     Going Back to Work:  Your care team will tell you when you are ready to return to work.    Limit the amount of work you do soon after your injury. This may speed healing. Take breaks if your symptoms get worse. You should also reduce your physical activity as well as activities that require a lot of thinking until you see your doctor. You may need shorter work days and a lighter workload.  Avoid heavy lifting, working with machinery, driving and working at heights until your symptoms are gone or you are cleared by a provider.    Going Back to School:  If you are still having symptoms, you may need extra help at school.  Tell your teachers and school nurse about your injury and symptoms. Ask them to watch for problems with learning, memory, and concentrating. Symptoms may get worse when you do schoolwork, and you may become more irritable. You may need shorter school days, a reduced workload, and to postpone testing.  Do not drive or take gym class (physical activity) until cleared by a provider.    Returning to Sports:  Never return to play if you have any symptoms. A full recovery will reduce the chances of getting hurt again. Remember, it is better to miss one or two games than a whole season.  You should rest from all physical activity until you see your provider. Generally, if all symptoms have completely cleared, your provider can help guide you to slowly return to sports. If symptoms return or worsen,  stop the activity and see your provider.  Important: If you are in an organized sport and under age 18, you will need written consent from a healthcare provider before you return to sports. Typically, this will be your primary care or sports medicine provider. Please make an appointment.    If you were given a prescription for medicine here today, be sure to read all of the information (including the package insert) that comes with your prescription.  This will include important information about the medicine, its side effects, and any warnings that you need to know about.  The pharmacist who fills the prescription can provide more information and answer questions you may have about the medicine.  If you have questions or concerns that the pharmacist cannot address, please call or return to the Emergency Department.     Remember that you can always come back to the Emergency Department if you are not able to see your regular provider in the amount of time listed above, if you get any new symptoms, or if there is anything that worries you.
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
## 2020-03-15 ENCOUNTER — HEALTH MAINTENANCE LETTER (OUTPATIENT)
Age: 30
End: 2020-03-15

## 2020-07-31 ENCOUNTER — OFFICE VISIT (OUTPATIENT)
Dept: OBGYN | Facility: CLINIC | Age: 30
End: 2020-07-31
Payer: COMMERCIAL

## 2020-07-31 VITALS — SYSTOLIC BLOOD PRESSURE: 112 MMHG | HEART RATE: 77 BPM | DIASTOLIC BLOOD PRESSURE: 68 MMHG | WEIGHT: 253 LBS

## 2020-07-31 DIAGNOSIS — Z30.432 ENCOUNTER FOR IUD REMOVAL: ICD-10-CM

## 2020-07-31 DIAGNOSIS — Z30.430 ENCOUNTER FOR IUD INSERTION: Primary | ICD-10-CM

## 2020-07-31 DIAGNOSIS — R87.810 ASCUS WITH POSITIVE HIGH RISK HPV CERVICAL: ICD-10-CM

## 2020-07-31 DIAGNOSIS — R87.610 ASCUS WITH POSITIVE HIGH RISK HPV CERVICAL: ICD-10-CM

## 2020-07-31 DIAGNOSIS — Z32.00 PREGNANCY EXAMINATION OR TEST, PREGNANCY UNCONFIRMED: ICD-10-CM

## 2020-07-31 LAB — HCG UR QL: NEGATIVE

## 2020-07-31 PROCEDURE — 57454 BX/CURETT OF CERVIX W/SCOPE: CPT | Performed by: OBSTETRICS & GYNECOLOGY

## 2020-07-31 PROCEDURE — 58300 INSERT INTRAUTERINE DEVICE: CPT | Performed by: OBSTETRICS & GYNECOLOGY

## 2020-07-31 PROCEDURE — 81025 URINE PREGNANCY TEST: CPT | Performed by: OBSTETRICS & GYNECOLOGY

## 2020-07-31 PROCEDURE — 88305 TISSUE EXAM BY PATHOLOGIST: CPT | Performed by: OBSTETRICS & GYNECOLOGY

## 2020-07-31 PROCEDURE — 58301 REMOVE INTRAUTERINE DEVICE: CPT | Mod: 51 | Performed by: OBSTETRICS & GYNECOLOGY

## 2020-07-31 RX ORDER — NALTREXONE HYDROCHLORIDE 50 MG/1
TABLET, FILM COATED ORAL DAILY
COMMUNITY
Start: 2020-07-22 | End: 2023-03-24

## 2020-07-31 RX ORDER — MULTIVIT WITH MINERALS/LUTEIN
1000 TABLET ORAL DAILY
COMMUNITY
End: 2023-02-01

## 2020-07-31 RX ORDER — MULTIPLE VITAMINS W/ MINERALS TAB 9MG-400MCG
1 TAB ORAL DAILY
COMMUNITY

## 2020-07-31 RX ORDER — CITALOPRAM HYDROBROMIDE 10 MG/1
TABLET ORAL
COMMUNITY
Start: 2020-06-10 | End: 2023-03-24

## 2020-07-31 RX ORDER — MIRTAZAPINE 30 MG/1
TABLET, FILM COATED ORAL
COMMUNITY
Start: 2020-06-12 | End: 2023-02-01

## 2020-07-31 RX ORDER — HYDROXYZINE PAMOATE 50 MG/1
CAPSULE ORAL PRN
COMMUNITY
Start: 2020-07-22

## 2020-07-31 NOTE — PATIENT INSTRUCTIONS
If you have any questions regarding your visit, Please contact your care team.    [facility]: [phone]      [facility] CLINIC HOURS TELEPHONE NUMBER   Amber Serrano .    SUDEEP Field -  Margaret -       ROSA ISELA Linder, RN  Anel, RN     Monday, Wednesday, Thursday and Friday, [place]  8:30a.m-5:00 p.m   [facility]  [address] [place] [address]  [phone] ask for [facility]    Imaging [phone]       Urgent Care locations:    [facility] Saturday and Sunday   9 am - 5 pm    Monday-Friday   12 pm - 8 pm  Saturday and Sunday   9 am - 5 pm   [phone]     [facility] Labor and Delivery:  [phone]    If you need a medication refill, please contact your pharmacy. Please allow 3 business days for your refill to be completed.  As always, Thank you for trusting us with your healthcare needs!

## 2020-08-01 NOTE — PROGRESS NOTES
This 30 y/o female, , using a Mirena IUD for contraception, presents as a new patient to the [facility] c/o an abnormal pap and DNA marker test.  She states that at [facility] in [place], her pap demonstrated ASCUS changes and her DNA marker test was positive for high-risk HPV subtypes on 2020.  Colposcopy exam was advised for follow up but she was hoping to just have a repeat pap smear today.  I explained why the colposcopy with biopsies was advised and informed consent was reviewed and obtained for this procedure.  Her current Mirena IUD was inserted in 2015 so is now overdue for removal and replacement.  She states that her last sexual exposure was toward the end of  so a UPT was checked today and was negative.  She requested removal and replacement of this Mirena IUD today and informed consent was reviewed and obtained as well.    /68   Pulse 77   Wt 114.8 kg (253 lb)   Breastfeeding No   ROS:  10 systems were reviewed and the positives were listed under problems.  Her UPT was negative.  A bi-valve speculum was placed and her cervix was soaked with 3% acetic acid.  After several minutes, this fluid was removed and colposcopy examination was performed.  Faint acetowhite change was noted at the 11 o'clock position of her ectocervix so endocervical curettings were obtained followed by an ectocervical biopsy at the 11 o'clock position.  She tolerated the procedure well and this was a satisfactory exam.  Next, her cervix was cleansed with betadine swabs x 3 and the 2 IUD strings were grasped with a Ring forceps.  Her current IUD was removed without difficulty and the  IUD was disposed of per protocol in a plastic specimen cup.  Next, the 12 o'clock position of her cervix was grasped with a single-toothed tenaculum.  A new Mirena IUD was inserted without difficulty after sounding her anteverted uterus to 7 cm.  The 2 IUD strings were trimmed to 3 cm each  and all instruments were removed.  Counts were correct and there were no complications.  EBL was 1 ml and f/u care and instructions were reviewed with the patient.  This was a 30-minute visit and over 50% of the time was spent in direct patient consultation and an additional 15 minutes were spent in the above listed procedures.  NDC # of new Mirena IUD:  94258-072-69  Exp Oct 2022  Lot #[id number]

## 2020-08-04 LAB — COPATH REPORT: NORMAL

## 2020-08-07 ENCOUNTER — OFFICE VISIT (OUTPATIENT)
Dept: OBGYN | Facility: CLINIC | Age: 30
End: 2020-08-07
Payer: COMMERCIAL

## 2020-08-07 VITALS — HEART RATE: 76 BPM | DIASTOLIC BLOOD PRESSURE: 70 MMHG | WEIGHT: 254.9 LBS | SYSTOLIC BLOOD PRESSURE: 122 MMHG

## 2020-08-07 DIAGNOSIS — N76.0 BV (BACTERIAL VAGINOSIS): ICD-10-CM

## 2020-08-07 DIAGNOSIS — B96.89 BV (BACTERIAL VAGINOSIS): ICD-10-CM

## 2020-08-07 DIAGNOSIS — N89.8 VAGINAL DISCHARGE: Primary | ICD-10-CM

## 2020-08-07 LAB
SPECIMEN SOURCE: ABNORMAL
WET PREP SPEC: ABNORMAL

## 2020-08-07 PROCEDURE — 87210 SMEAR WET MOUNT SALINE/INK: CPT | Performed by: OBSTETRICS & GYNECOLOGY

## 2020-08-07 PROCEDURE — 99214 OFFICE O/P EST MOD 30 MIN: CPT | Performed by: OBSTETRICS & GYNECOLOGY

## 2020-08-07 RX ORDER — METRONIDAZOLE 500 MG/1
500 TABLET ORAL 2 TIMES DAILY
Qty: 14 TABLET | Refills: 0 | Status: SHIPPED | OUTPATIENT
Start: 2020-08-07 | End: 2020-08-14

## 2020-08-07 NOTE — PROGRESS NOTES
Veronique is a 29 year old  referred here by self for consultation regarding vaginal discharge, with itching and odor and vaginal irritation.  She has Colposcopy, biopsy, IUD removal and insertion on 20.  ROS: Ten point review of systems was reviewed and negative except the above.    Gyne: + abn pap (last pap ), - STD's    History reviewed. No pertinent past medical history.  Past Surgical History:   Procedure Laterality Date     cyst removed from ovary       DILATION AND CURETTAGE       GALLBLADDER SURGERY       There is no problem list on file for this patient.      ALL/Meds: Her medication and allergy histories were reviewed and are documented in their appropriate chart areas.    SH: - tob, - EtOH,     FH: Her family history was reviewed and documented in its appropriate chart area.    PE: /70   Pulse 76   Wt 115.6 kg (254 lb 14.4 oz)   LMP  (LMP Unknown)   Breastfeeding No   There is no height or weight on file to calculate BMI.    General Appearance:  healthy, alert, active, no distress  HEENT: NCAT  Abdomen: Soft, nontender.  Normal bowel sounds.  No masses  Pelvic:       - Ext: NEFG,        - Urethra: no lesions, no masses, no hypermobility       - Urethral Meatus: normal appearance,        - Bladder: no tenderness, no masses       - Vagina: pink, moist, normal rugae, no lesions, small discharge       - Cervix: no lesions, . IUD strings visible and appropriate.parous      Nurse for exam.  Results for orders placed or performed in visit on 20   Wet prep     Status: Abnormal    Specimen: Vagina   Result Value Ref Range    Specimen Description Vagina     Wet Prep No Trichomonas seen     Wet Prep Moderate  Clue cells seen   (A)     Wet Prep No yeast seen     Wet Prep Few  WBC'S seen            A/P    ICD-10-CM    1. Vaginal discharge  N89.8 Wet prep     metroNIDAZOLE (FLAGYL) 500 MG tablet   2. BV (bacterial vaginosis)  N76.0 metroNIDAZOLE (FLAGYL) 500 MG tablet    B96.89      Treat  BV.      30 minutes was spent face to face with the patient today discussing her history, diagnosis, and follow-up plan as noted above.  Over 50% of the visit was spent in counseling and coordination of care.    Total Visit Time: 30 minutes.        CEPHAS AGBEH, MD.

## 2020-08-07 NOTE — PATIENT INSTRUCTIONS
If you have any questions regarding your visit, Please contact your care team.  [facility]: [phone]  [facility] CLINIC HOURS TELEPHONE NUMBER   Cephas Agbeh, M.D.      Rickie Stark-  Margaret-         Monday-[place]    8:00a.m-4:45 p.m    Tuesday--[place]     8:00a.m-4:45 p.m.    Thursday-[place]    8:00a.m-4:45 p.m.    Friday-[place]    8:00a.m-4:45 p.m    [facility]   [address] [place] [address]   [phone]-Ask for [facility]   Fax [phone]   [phone]     [facility]   [address] [place] [address] [place] [address]   [phone]    [facility]  [address] [facility] [address]  [phone]  [phone]   Urgent Care locations:    [facility] Monday-Friday  5 pm - 9 pm  Saturday and Sunday   9 am - 5 pm   Monday-Friday   5 pm - 9 pm  Saturday and Sunday  9 am - 5 pm    [phone]   If you need a medication refill, please contact your pharmacy. Please allow 3 business days for your refill to be completed.  As always, Thank you for trusting us with your healthcare needs!

## 2021-01-15 ENCOUNTER — HEALTH MAINTENANCE LETTER (OUTPATIENT)
Age: 31
End: 2021-01-15

## 2021-05-09 ENCOUNTER — HEALTH MAINTENANCE LETTER (OUTPATIENT)
Age: 31
End: 2021-05-09

## 2021-10-24 ENCOUNTER — HEALTH MAINTENANCE LETTER (OUTPATIENT)
Age: 31
End: 2021-10-24

## 2022-06-05 ENCOUNTER — HEALTH MAINTENANCE LETTER (OUTPATIENT)
Age: 32
End: 2022-06-05

## 2022-08-31 ENCOUNTER — APPOINTMENT (OUTPATIENT)
Dept: CT IMAGING | Facility: CLINIC | Age: 32
End: 2022-08-31
Attending: PHYSICIAN ASSISTANT
Payer: COMMERCIAL

## 2022-08-31 ENCOUNTER — HOSPITAL ENCOUNTER (EMERGENCY)
Facility: CLINIC | Age: 32
Discharge: HOME | End: 2022-08-31
Attending: PHYSICIAN ASSISTANT | Admitting: PHYSICIAN ASSISTANT
Payer: COMMERCIAL

## 2022-08-31 VITALS
TEMPERATURE: 98.1 F | WEIGHT: 215.8 LBS | RESPIRATION RATE: 16 BRPM | DIASTOLIC BLOOD PRESSURE: 79 MMHG | SYSTOLIC BLOOD PRESSURE: 120 MMHG | HEART RATE: 55 BPM | OXYGEN SATURATION: 100 %

## 2022-08-31 DIAGNOSIS — R19.7 DIARRHEA, UNSPECIFIED TYPE: ICD-10-CM

## 2022-08-31 DIAGNOSIS — R10.84 ABDOMINAL PAIN, GENERALIZED: ICD-10-CM

## 2022-08-31 LAB
ALBUMIN SERPL BCG-MCNC: 4.4 G/DL (ref 3.5–5.2)
ALBUMIN UR-MCNC: 30 MG/DL
ALP SERPL-CCNC: 94 U/L (ref 35–104)
ALT SERPL W P-5'-P-CCNC: 46 U/L (ref 10–35)
ANION GAP SERPL CALCULATED.3IONS-SCNC: 12 MMOL/L (ref 7–15)
APPEARANCE UR: CLEAR
AST SERPL W P-5'-P-CCNC: 39 U/L (ref 10–35)
BILIRUB DIRECT SERPL-MCNC: <0.2 MG/DL (ref 0–0.3)
BILIRUB SERPL-MCNC: 0.3 MG/DL
BILIRUB UR QL STRIP: NEGATIVE
BUN SERPL-MCNC: 9.4 MG/DL (ref 6–20)
CALCIUM SERPL-MCNC: 9.6 MG/DL (ref 8.6–10)
CAOX CRY #/AREA URNS HPF: ABNORMAL /HPF
CHLORIDE SERPL-SCNC: 102 MMOL/L (ref 98–107)
COLOR UR AUTO: YELLOW
CREAT SERPL-MCNC: 0.78 MG/DL (ref 0.51–0.95)
DEPRECATED HCO3 PLAS-SCNC: 26 MMOL/L (ref 22–29)
ERYTHROCYTE [DISTWIDTH] IN BLOOD BY AUTOMATED COUNT: 13.2 % (ref 10–15)
GFR SERPL CREATININE-BSD FRML MDRD: >90 ML/MIN/1.73M2
GLUCOSE SERPL-MCNC: 78 MG/DL (ref 70–99)
GLUCOSE UR STRIP-MCNC: NEGATIVE MG/DL
HCG UR QL: NEGATIVE
HCT VFR BLD AUTO: 43.9 % (ref 35–47)
HGB BLD-MCNC: 14 G/DL (ref 11.7–15.7)
HGB UR QL STRIP: NEGATIVE
KETONES UR STRIP-MCNC: ABNORMAL MG/DL
LEUKOCYTE ESTERASE UR QL STRIP: NEGATIVE
LIPASE SERPL-CCNC: 40 U/L (ref 13–60)
MCH RBC QN AUTO: 30 PG (ref 26.5–33)
MCHC RBC AUTO-ENTMCNC: 31.9 G/DL (ref 31.5–36.5)
MCV RBC AUTO: 94 FL (ref 78–100)
MUCOUS THREADS #/AREA URNS LPF: PRESENT /LPF
NITRATE UR QL: NEGATIVE
PH UR STRIP: 6.5 [PH] (ref 5–7)
PLATELET # BLD AUTO: 341 10E3/UL (ref 150–450)
POTASSIUM SERPL-SCNC: 4.1 MMOL/L (ref 3.4–5.3)
PROT SERPL-MCNC: 7.3 G/DL (ref 6.4–8.3)
RBC # BLD AUTO: 4.67 10E6/UL (ref 3.8–5.2)
RBC URINE: 3 /HPF
SODIUM SERPL-SCNC: 140 MMOL/L (ref 136–145)
SP GR UR STRIP: 1.01 (ref 1–1.03)
SQUAMOUS EPITHELIAL: 9 /HPF
UROBILINOGEN UR STRIP-MCNC: 2 MG/DL
WBC # BLD AUTO: 11.9 10E3/UL (ref 4–11)
WBC URINE: 3 /HPF

## 2022-08-31 PROCEDURE — 96374 THER/PROPH/DIAG INJ IV PUSH: CPT | Mod: 59

## 2022-08-31 PROCEDURE — 81025 URINE PREGNANCY TEST: CPT | Performed by: EMERGENCY MEDICINE

## 2022-08-31 PROCEDURE — 80053 COMPREHEN METABOLIC PANEL: CPT | Performed by: EMERGENCY MEDICINE

## 2022-08-31 PROCEDURE — 85027 COMPLETE CBC AUTOMATED: CPT | Performed by: EMERGENCY MEDICINE

## 2022-08-31 PROCEDURE — 250N000013 HC RX MED GY IP 250 OP 250 PS 637: Performed by: PHYSICIAN ASSISTANT

## 2022-08-31 PROCEDURE — 85041 AUTOMATED RBC COUNT: CPT | Performed by: EMERGENCY MEDICINE

## 2022-08-31 PROCEDURE — 81001 URINALYSIS AUTO W/SCOPE: CPT | Performed by: EMERGENCY MEDICINE

## 2022-08-31 PROCEDURE — 250N000009 HC RX 250: Performed by: PHYSICIAN ASSISTANT

## 2022-08-31 PROCEDURE — 250N000011 HC RX IP 250 OP 636: Performed by: PHYSICIAN ASSISTANT

## 2022-08-31 PROCEDURE — 83690 ASSAY OF LIPASE: CPT | Performed by: PHYSICIAN ASSISTANT

## 2022-08-31 PROCEDURE — 74177 CT ABD & PELVIS W/CONTRAST: CPT

## 2022-08-31 PROCEDURE — 82248 BILIRUBIN DIRECT: CPT | Performed by: PHYSICIAN ASSISTANT

## 2022-08-31 PROCEDURE — 99285 EMERGENCY DEPT VISIT HI MDM: CPT | Mod: 25

## 2022-08-31 PROCEDURE — 36415 COLL VENOUS BLD VENIPUNCTURE: CPT | Performed by: EMERGENCY MEDICINE

## 2022-08-31 RX ORDER — IOPAMIDOL 755 MG/ML
500 INJECTION, SOLUTION INTRAVASCULAR ONCE
Status: COMPLETED | OUTPATIENT
Start: 2022-08-31 | End: 2022-08-31

## 2022-08-31 RX ORDER — KETOROLAC TROMETHAMINE 30 MG/ML
15 INJECTION, SOLUTION INTRAMUSCULAR; INTRAVENOUS ONCE
Status: COMPLETED | OUTPATIENT
Start: 2022-08-31 | End: 2022-08-31

## 2022-08-31 RX ORDER — DICYCLOMINE HCL 20 MG
20 TABLET ORAL 4 TIMES DAILY PRN
Qty: 12 TABLET | Refills: 0 | Status: SHIPPED | OUTPATIENT
Start: 2022-08-31 | End: 2022-09-03

## 2022-08-31 RX ORDER — ACETAMINOPHEN 500 MG
1000 TABLET ORAL ONCE
Status: COMPLETED | OUTPATIENT
Start: 2022-08-31 | End: 2022-08-31

## 2022-08-31 RX ADMIN — IOPAMIDOL 90 ML: 755 INJECTION, SOLUTION INTRAVENOUS at 17:59

## 2022-08-31 RX ADMIN — ACETAMINOPHEN 1000 MG: 500 TABLET, FILM COATED ORAL at 16:51

## 2022-08-31 RX ADMIN — SODIUM CHLORIDE 65 ML: 9 INJECTION, SOLUTION INTRAVENOUS at 17:59

## 2022-08-31 RX ADMIN — KETOROLAC TROMETHAMINE 15 MG: 30 INJECTION, SOLUTION INTRAMUSCULAR at 17:31

## 2022-08-31 ASSESSMENT — ENCOUNTER SYMPTOMS
FEVER: 0
HEMATURIA: 0
DIARRHEA: 1
ABDOMINAL PAIN: 1
CHILLS: 0
NAUSEA: 1
BACK PAIN: 1
DYSURIA: 0
VOMITING: 1
BLOOD IN STOOL: 0

## 2022-08-31 ASSESSMENT — ACTIVITIES OF DAILY LIVING (ADL): ADLS_ACUITY_SCORE: 35

## 2022-08-31 NOTE — ED TRIAGE NOTES
Patient states she has had 2 weeks of severe abdominal pain that radiates into her back. Patient reports nausea, vomiting, diarrhea. Patient has a IUD.  Patient feels she has pancreatitis.      Triage Assessment     Row Name 08/31/22 1527       Triage Assessment (Adult)    Airway WDL WDL       Respiratory WDL    Respiratory WDL WDL       Skin Circulation/Temperature WDL    Skin Circulation/Temperature WDL WDL       Cardiac WDL    Cardiac WDL WDL       Peripheral/Neurovascular WDL    Peripheral Neurovascular WDL WDL       Cognitive/Neuro/Behavioral WDL    Cognitive/Neuro/Behavioral WDL WDL

## 2022-08-31 NOTE — ED PROVIDER NOTES
History     Chief Complaint:  Abdominal Pain       HPI   Veronique Denise is a 31 year old femalewho presents to the emergency department with her  for constant diffuse abdominal pain that goes into her back for 2 weeks.  She is also had accompanying nausea vomiting diarrhea.  The she has not had any vomiting for the last couple of days.  No bloody stools.  No fevers.  No recent travel outside [place].  She was seen last Friday at [facility] and she reports they did some blood testing and diagnosed her with a viral enteritis.  She has not tried any over-the-counter treatments for her pain.  She has only history of cyst removal from an ovary however no other abdominal surgeries.    ROS:  Review of Systems   Constitutional: Negative for chills and fever.   Gastrointestinal: Positive for abdominal pain, diarrhea, nausea and vomiting. Negative for blood in stool.   Genitourinary: Negative for dysuria and hematuria.   Musculoskeletal: Positive for back pain.   All other systems reviewed and are negative.    Allergies:  No Known Allergies     Medications:    dicyclomine (BENTYL) 20 MG tablet  CHANTIX STARTING MONTH HANNAH 0.5 MG X 11 & 1 MG X 42 tablet  citalopram (CELEXA) 10 MG tablet  hydrOXYzine (VISTARIL) 50 MG capsule  mirtazapine (REMERON) 30 MG tablet  multivitamin w/minerals (MULTI-VITAMIN) tablet  naltrexone (DEPADE/REVIA) 50 MG tablet  vitamin C (ASCORBIC ACID) 1000 MG TABS        Past Medical History:    No past medical history on file.  There is no problem list on file for this patient.       Past Surgical History:    Past Surgical History:   Procedure Laterality Date     cyst removed from ovary       DILATION AND CURETTAGE       GALLBLADDER SURGERY          Family History:    family history includes Cancer in her brother, maternal grandfather, maternal grandmother, and maternal uncle.    Social History:   reports that she has been smoking. She has never used smokeless tobacco. She  reports previous alcohol use. She reports previous drug use.  PCP: Heavenly Cage     Physical Exam     Patient Vitals for the past 24 hrs:   BP Temp Temp src Pulse Resp SpO2 Weight   08/31/22 1845 120/79 -- -- 55 16 100 % --   08/31/22 1528 119/87 98.1  F (36.7  C) Oral 71 18 98 % 97.9 kg (215 lb 12.8 oz)        Physical Exam  General:Vitals reviewed  Psych: Normal Affect  Neuro: Normal mentation.  Eyes: Nonicteric, noninjected, normal range of motion, PERRLA  Nose: Not congested, no rhinorrhea  Lungs: Bilateral breath sounds, Clear to auscultation, no wheezing, rhonchi, Rales, crackles.  Normal respiratory excursion.  Heart: Regular rate and rhythm without murmurs, rubs, gallops  Abdomen:Soft, nontender to palpation in all 4 quadrants without rebound or guarding.  Nondistended.  No McBurney's point tenderness.  No Fairbanks sign.  No CVA tenderness    Emergency Department Course     Imaging:  CT Abdomen Pelvis w Contrast   Final Result   IMPRESSION:    1.  No significant findings on CT abdomen and pelvis.         Report per radiology    Laboratory:  Labs Ordered and Resulted from Time of ED Arrival to Time of ED Departure   CBC WITH PLATELETS - Abnormal       Result Value    WBC Count 11.9 (*)     RBC Count 4.67      Hemoglobin 14.0      Hematocrit 43.9      MCV 94      MCH 30.0      MCHC 31.9      RDW 13.2      Platelet Count 341     ROUTINE UA WITH MICROSCOPIC REFLEX TO CULTURE - Abnormal    Color Urine Yellow      Appearance Urine Clear      Glucose Urine Negative      Bilirubin Urine Negative      Ketones Urine Trace (*)     Specific Gravity Urine 1.015      Blood Urine Negative      pH Urine 6.5      Protein Albumin Urine 30  (*)     Urobilinogen Urine 2.0      Nitrite Urine Negative      Leukocyte Esterase Urine Negative      Mucus Urine Present (*)     Calcium Oxalate Crystals Urine Few (*)     RBC Urine 3 (*)     WBC Urine 3      Squamous Epithelials Urine 9 (*)    HEPATIC FUNCTION PANEL - Abnormal    Protein  Total 7.3      Albumin 4.4      Bilirubin Total 0.3      Alkaline Phosphatase 94      AST 39 (*)     ALT 46 (*)     Bilirubin Direct <0.20     BASIC METABOLIC PANEL - Normal    Creatinine 0.78      Sodium 140      Potassium 4.1      Urea Nitrogen 9.4      Chloride 102      Carbon Dioxide (CO2) 26      Anion Gap 12      Glucose 78      GFR Estimate >90      Calcium 9.6     HCG QUALITATIVE URINE - Normal    hCG Urine Qualitative Negative     LIPASE - Normal    Lipase 40          Procedures         Emergency Department Course:             Reviewed:  I reviewed nursing notes, vitals and past medical history    Assessments/Consults:   ED Course as of 08/31/22 1856   Wed Aug 31, 2022   1826 Results discussed with the patient, patient continues to complain of pain but abdominal exam continues to be normal and non tender.         Interventions:  Medications   acetaminophen (TYLENOL) tablet 1,000 mg (1,000 mg Oral Given 8/31/22 1651)   ketorolac (TORADOL) injection 15 mg (15 mg Intravenous Given 8/31/22 1731)   CT Scan Flush (65 mLs Intravenous Given 8/31/22 1759)   iopamidol (ISOVUE-370) solution 500 mL (90 mLs Intravenous Given 8/31/22 1759)        Disposition:  The patient was discharged to home.     Impression & Plan    CMS Diagnoses: None    Medical Decision Making:    This is a 31-year-old female that presents to the emergency department with generalized abdominal pain and diarrhea she has had for 2 weeks.  She is accompanied by her .  I considered multiple diagnoses including but not limited to cholecystitis, diverticulitis, pancreatitis, appendicitis, enteritis, nephrolithiasis, urinary tract infection among others.  After careful review of her history present illness, physical exam findings, laboratory values, and CT imaging today my diagnosis and impression is below.  CT imaging was completely negative.  Her abdominal exam continues to be benign and does not cause pain but the patient reports pain.   Interventions of Toradol and Tylenol were given with minimal relief.  Patient does not appear to be in pain during my exams.  She is having persistent diarrhea and I suspect she has underlying enteritis likely having abdominal cramping from this.  Pregnancy test was negative.  UA is not consistent with infection.  White count slightly bumped with leukocytosis of 11.9.  Patient remained afebrile and stable here.  She is not septic.  Patient we discharged home with Bentyl as I think this will help with abdominal cramping recommend close follow-up with her primary care provider.  Return back to the emergency department if she develops worsening pain, nausea vomiting fevers or worsening condition.  Enteric pathogen stool studies are recommended and she will be sent home with collection kit and will bring this back for her diarrhea to be tested for enteric pathogens.      My impression of today's diagnosis is:     ICD-10-CM    1. Abdominal pain, generalized  R10.84    2. Diarrhea, unspecified type  R19.7 Enteric Bacteria and Virus Panel by YARITZA Stool       Discharge Medications:  Discharge Medication List as of 8/31/2022  6:45 PM      START taking these medications    Details   dicyclomine (BENTYL) 20 MG tablet Take 1 tablet (20 mg) by mouth 4 times daily as needed, Disp-12 tablet, R-0, Local Print              8/31/2022   Hakan Caballero PA-C Kruger, Jacob C, PA-C  08/31/22 1579

## 2022-09-14 ENCOUNTER — HOSPITAL ENCOUNTER (EMERGENCY)
Facility: CLINIC | Age: 32
Discharge: HOME | End: 2022-09-14
Attending: INTERNAL MEDICINE | Admitting: INTERNAL MEDICINE
Payer: COMMERCIAL

## 2022-09-14 ENCOUNTER — HOSPITAL ENCOUNTER (EMERGENCY)
Facility: CLINIC | Age: 32
Discharge: STILL ADMITTED OR ADMINISTRATIVE | End: 2022-09-15
Payer: MEDICAID

## 2022-09-14 VITALS
DIASTOLIC BLOOD PRESSURE: 66 MMHG | HEART RATE: 88 BPM | TEMPERATURE: 98.1 F | OXYGEN SATURATION: 96 % | BODY MASS INDEX: 36.65 KG/M2 | RESPIRATION RATE: 16 BRPM | HEIGHT: 65 IN | SYSTOLIC BLOOD PRESSURE: 109 MMHG | WEIGHT: 220 LBS

## 2022-09-14 DIAGNOSIS — F39 MOOD DISORDER (H): ICD-10-CM

## 2022-09-14 DIAGNOSIS — F19.10 SUBSTANCE ABUSE (H): ICD-10-CM

## 2022-09-14 PROCEDURE — 99284 EMERGENCY DEPT VISIT MOD MDM: CPT | Performed by: INTERNAL MEDICINE

## 2022-09-14 PROCEDURE — 99285 EMERGENCY DEPT VISIT HI MDM: CPT | Mod: 25 | Performed by: INTERNAL MEDICINE

## 2022-09-14 PROCEDURE — 90791 PSYCH DIAGNOSTIC EVALUATION: CPT

## 2022-09-14 ASSESSMENT — ENCOUNTER SYMPTOMS
COLOR CHANGE: 0
ARTHRALGIAS: 0
NECK STIFFNESS: 0
ABDOMINAL PAIN: 0
CONFUSION: 0
DIFFICULTY URINATING: 0
SHORTNESS OF BREATH: 0
EYE REDNESS: 0
FEVER: 0
HEADACHES: 0

## 2022-09-14 ASSESSMENT — ACTIVITIES OF DAILY LIVING (ADL): ADLS_ACUITY_SCORE: 33

## 2022-09-15 NOTE — CONSULTS
Diagnostic Evaluation Consultation  Crisis Assessment    Patient was assessed: In Person  Patient location: [facility] ED 14  Was a release of information signed: yes       Referral Data and Chief Complaint  Pt is a 31 year old, who uses she/her pronouns, and presents to the ED with family/friends. Patient is referred to the ED by family/friends. Patient is presenting to the ED for the following concerns: alcohol abuse.      Informed Consent and Assessment Methods  Patient is her own guardian. Writer met with patient and explained the crisis assessment process, including applicable information disclosures and limits to confidentiality, assessed understanding of the process, and obtained consent to proceed with the assessment. Patient was observed to be able to participate in the assessment as evidenced by engaged in assessment . Assessment methods included conducting a formal interview with patient, review of medical records, collaboration with medical staff, and obtaining relevant collateral information from family and community providers when available..     Over the course of this crisis assessment provided reassurance, offered validation, engaged patient in problem solving and disposition planning and worked with patient on safety and aftercare planning.      Summary of Patient Situation  Pt brought to the ED by her brother with reports that she is seeking detox. RN note also indicates concerns for SI.  Prior to assessment pt was irritable and demanding to leave.  She was able to be talked to by staff and calmed down and was able to engage in the assessment.  She states that now that she is here in the ED she wants to leave. She does want to go to treatment but does not want detox. She is guarded with information about her use and says she only uses on the weekends.  She does admit that she was drinking before arrival today.          Brief Psychosocial History  Pt lives with her dad. She is currently not employed.      Significant Clinical History   Pt with history of depression and alcohol abuse. Per EMR she has been hospitalized in 2/2016.   She is seeking a weekly therapist.     Collateral Information  Phone call with pts mom and brother Villa [phone]. Mom and son are together and mom puts pts brother on the phone. He states that pt asked him to bring her to the ED because she needed to stop using drugs and alcohol.  He believes her to be using alcohol, methamphetamine, cannabis and any other substance that is available. She has abused substances for many years.  When she is using she does not come home to her dads and will sleep in a storage shed.  Family does not report pt making any suicidal or homicidal comments.     Risk Assessment  ESS-6  1.a. Over the past 2 weeks, have you had thoughts of killing yourself? No  1.b. Have you ever attempted to kill yourself and, if yes, when did this last happen? No   2. Recent or current suicide plan? No   3. Recent or current intent to act on ideation? No  4. Lifetime psychiatric hospitalization? Yes  5. Pattern of excessive substance use? Yes  6. Current irritability, agitation, or aggression? No  Scoring note: BOTH 1a and 1b must be yes for it to score 1 point, if both are not yes it is zero. All others are 1 point per number. If all questions 1a/1b - 6 are no, risk is negligible. If one of 1a/1b is yes, then risk is mild. If either question 2 or 3, but not both, is yes, then risk is automatically moderate regardless of total score. If both 2 and 3 are yes, risk is automatically high regardless of total score.      Score: 2, moderate risk      Does the patient have access to lethal means? Common means outside a secure setting      Does the patient engage in non-suicidal self-injurious behavior (NSSI/SIB)? no     Does the patient have thoughts of harming others? No     Is the patient engaging in sexually inappropriate behavior?  no        Current Substance Abuse  Is there  "recent substance abuse? Alcohol. Says she drinks on the weekends. She also then admits that she was drinking today.  Daily use of Cannabis.  Denies use of other drugs.    She has been through CD treatment \"a couple of times.\"  Last time was \"a few years ago.\"  She does not recall the locations.      Was a urine drug screen or blood alcohol level obtained: No       Mental Status Exam   Affect: Constricted   Appearance: Appropriate    Attention Span/Concentration: Attentive  Eye Contact: Engaged   Fund of Knowledge: Appropriate    Language /Speech Content: Fluent   Language /Speech Volume: Normal    Language /Speech Rate/Productions: Normal    Recent Memory: Intact   Remote Memory: Intact   Mood: Irritable    Orientation to Person: Yes    Orientation to Place: Yes   Orientation to Time of Day: Yes    Orientation to Date: Yes    Situation (Do they understand why they are here?): Yes    Psychomotor Behavior: Normal    Thought Content: Clear   Thought Form: Intact      History of commitment: No    Medication    Psychotropic medications: Yes  Medication changes made in the last two weeks: No       Current Care Team  Primary Care Provider: [facility]  Psychiatrist: No  Therapist: No  : No   CTSS or ARMHS: No  ACT Team: No    Diagnosis  311 (F32.9) Unspecified Depressive Disorder    Substance-Related & Addictive Disorders Alcohol Use Disorder   303.90 (F10.20) Severe currently active  - by history       Clinical Summary and Substantiation of Recommendations    Pt does not appear to be an imminent risk to self or others. She is abusing alcohol and possibly other substances.  She denies suicidal or homicidal thoughts, plans, actions or intentions. Her insight and judgement appear intact.  She initially presented saying she wanted detox. She now declines detox. She is referred for a chemical dependency assessment.   Disposition  Recommended disposition: Rule 25/AGAPITO Assessment       Reviewed case and recommendations " with attending provider. Attending Name: Dr. Mccarthy        Attending concurs with disposition: Yes       Patient concurs with disposition: Yes      Final disposition: Substance abuse disorder treatment .     Outpatient Details (if applicable):   Aftercare plan and appointments placed in the AVS and provided to patient: Yes. Given to patient by ED RN     Was lethal means counseling provided as a part of aftercare planning? Advised to stop abusing alcohol and drugs.      Assessment Details  Patient interview started at: 700p and completed at: 720p.     Total duration spent on the patient case in minutes: .50 hrs      CPT code(s) utilized: 33476 - Psychotherapy for Crisis - 60 (30-74*) min       Jeni Acosta,LOGAN, LICSW, LMHP  DEC - Triage & Transition Services  Callback: [phone]

## 2022-09-15 NOTE — DISCHARGE INSTRUCTIONS
Substance Use Disorder Direct Access Resources    It is recommended that you abstain from all mood altering chemicals. Please contact the sober support hotline ([phone]) as needed; phones are answered 24 hours a day, 7 days a week.    To access substance use treatment you must have a comprehensive assessment completed to begin any treatment program.     If uninsured, please contact your county of residence for eligibility screen to substance use disorder evaluation and treatment:    [place] - [phone]   [place] - [phone]   [facility] [phone]   [place] - [phone]   [place]  [phone]   [place] - [phone]   [facility] [phone]   [place] - [phone]     If you have private insurance, call the customer service number on the back of your insurance card to find an in-network substance abuse use disorder assessment. The ideal provider will be a treatment facility, licensed in the [facility].     Community AGAPITO Evaluations: Clients may call their Count includes the [facility] for a full list of providers - Availability and services listed belo are subject to change, please call the provider to confirm    [facility]  [phone]  [facility] [place], [place], [address]  *Please call the above number to schedule a comprehensive assessment for determination of level of care needs. In person and virtual appointments available Mon-Fri.    [facility], [facility] [address] Floor, [address], [place] [address] (next to the outpatient lab)    Phone: [phone]   Provides bridging services to people with Opiate Use Disorders (OUD) seeking care. This is a front door to Medication Assisted Treatments (MAT), ages 16+  Walk In hours: Monday-Friday 9:00am-3:00pm    [facility]  [phone]  Walk in Assessments: Mon-Friday 7a-1:45p  [address], [place], [address]    [facility]  Central Access [phone]  [address] [place] [address]  [place], [place], [address]  *by appointment only    [facility]  [phone] (phone consultation available )  Locations in: [place], [place], [facility], and [place], [place]  [place] virtual IOP [phone] or visit [email or web address]   Also offers LGBTQ programming     [facility]  [phone]  [address]  [place], [place], [address]  *Currently only offered via telehealth - call to set up an appointment    [facility]  [address] [place], [place], [address]  [facility]  For more information to to make a referral call:  [phone]  Walk-in on   9-11 a.m.    [facility]  [phone]  [address] [place], [place], [address]  *available by appointments only    [facility]  [phone]  [address] [place], [place], [address]  *available by appointment only    [facility]  [phone]  [address] [place], [place], [address]  *walk in assessments available M-F starting at 7 am.    [facility]  [phone]  Locations: [facility], [facility], and [place]  *Walk in assessments availble M-F starting at 8 am -virtual only    [facility]  [phone]  [address] [place] [address]    [facility]  Virtual + Locations: [place], [place], [place], [place], [facility], [place], [place], [place]   [phone]  *available by appointment only    [facility]  [phone]  [address]  [place], [place], [address]    [facility]  [phone]  [address], [place], [address]  *available by appointment    [facility]  [phone]  [address] [place], [place], [address]  *walk ins available M-TH from 9-3    [facility] program: [phone]  [address]  [place], [address]    [facility]  [phone]  Same day substance use disorder assessments are available Monday - Friday, via walk-in or by appointment at the [place] location.  [address], [address], [place] [address]     [facility] - adolescent and adult SUDs services  [phone]  Offer services Monday through Friday, as well as evening hours Monday through Thursday. Normally, a first appointment will be scheduled within one week  [email or web address]  Locations all over [place]    If you are intoxicated, you may be required to detox at a detox facility before starting treatment. The following are detox facilities that you can self present to. All detox facilities are able to help you complete an assessment prior to discharge if you choose:    [facility]: Arrive at a [facility] for immediate medical evaluation    [facility]: [address] [place], [place], [address].         [phone]    [facility]: [address] [place], [place], [address]  [phone]     [facility]): [address] [place], [place], [address]  [phone]     [facility]: [address] [place], [place], [address], [phone]         Ways to help cope with sobriety:    -- Take prescribed medicines as scheduled  -- Keep follow-up appointments  -- Talk to others about your concerns  -- Get regular exercise  -- Practice deep breathing skills  -- Eat a healthy diet  -- Use community resources, including hotline numbers, [facility] crisis and support meetings  -- Stay sober and avoid places/people/things associated with substance use  --Maintain a daily schedule/routine  --Get at least 7-8 hours of sleep per night  --Create a list 10--20 healthy activities that you can do that are enjoyable and do not involve substance use  --Create daily goals (approx. 1-4 goals) per day and work to achieve them throughout the day.       Free  Resources:    [facility] ([facility])  [facility] connects people seeking recovery to resources that help foster and sustain long-term recovery. Whether you are seeking resources for treatment, transportation, housing, job training, education, health care or other pathways to recovery, [facility] is a great place to start.  Phone: 676.873.7090. www.[place]Veruta.INNFOCUS (Great listing of all types of recovery and non-recovery related resources)    Alcoholics Anonymous  Phone: 7-741-ALCOHOL  Website: HTTP://WWW.AA.ORG/  [facility] [place] (857-662-5017 or http://aaiFood.org)  [facility] [place] (019-123-5615 or www.aastpaul.org)     Narcotics Anonymous  Phone: 879.480.1126  Website: www.[employer].Visonys.    [facility] [address], [place], [place],  Phone: [phone]  Drop-in Hours: Monday-Friday 9-11:30 am. By appointment at other times.  Provides: [facility] is a drop-in center on the east side [facility] that provides a safe space for individuals who are homeless and have a history of chemical use. Sobriety is not a requirement but drugs and alcohol are not allowed on the property.  Services: Non-clients can access drop-in services such as Recovery and Harm Reduction Groups, referrals to case management, community activities, shower facilities, and a pool table. Individuals who are homeless and have chemical health needs may be eligible for enrollment into [facility]'s case management program. Clients and  work together to access benefits, treatment, health care, shelter, and external housing resources.

## 2022-09-15 NOTE — ED NOTES
Discharge instructions reviewed with patient and patient verbalized understanding. Writer spoke with patient's mom; patient's mom states she's going to call Villa, patient's brother, to pick her up. Patient notified and aware of plan. Patient ambulatory with steady gait. Patient left with all her belongings.

## 2022-09-15 NOTE — ED PROVIDER NOTES
"    [facility] EMERGENCY DEPARTMENT ([facility])     September 14, 2022    History     Chief Complaint   Patient presents with     Drug / Alcohol Assessment     Patient brought in by her brother for detox from alcohol and drug abuse.  Patient states drinks vodka daily last drink was a \"few hours ago\".  Patient also admits to smoking Meth last use 1 week ago and various pills that she just randomly takes and has no knowledge on what they are.  Denies HI/AH/VH.  +SI no plan     Suicidal     +SI denies any plan     HPI  Veronique Oliva is a 31 year old female with a past medical history significant for alcohol abuse, anxiety, and recurrent major depressive disorder who presents to the Emergency Department seeking detox. Per ED , Patient came in to the ED seeking detox but states she wants to leave and don't want detox. Patient states her brother brought her against her will. Patient state she only drinks alcohol during the weekend. Patient states she had couple of drinks hours before coming in. Patient states she also uses marijuana. Patient brother states patient used meth and other drugs she can find. Patient denies any suicidal ideations.    Past Medical History  Past Medical History:   Diagnosis Date     Alcohol abuse      Anxiety      Depressive disorder      Suicidal ideation 2/5/2016     Past Surgical History:   Procedure Laterality Date     CHOLECYSTECTOMY       HC REMOVAL OF OVARIAN CYST(S)       UPPER GI ENDOSCOPY  2015     buPROPion (WELLBUTRIN SR) 100 MG 12 hr tablet  oxyCODONE-acetaminophen (PERCOCET) 5-325 MG per tablet  predniSONE (DELTASONE) 20 MG tablet  sertraline (ZOLOFT) 50 MG tablet      Allergies   Allergen Reactions     Cat Hair Extract Itching     Itchy throat and ears     Cats Itching     Family History  Family History   Problem Relation Age of Onset     Brain Cancer Paternal Grandfather      Bladder Cancer Brother      Social History   Social History     Tobacco Use     " "Smoking status: Current Every Day Smoker     Packs/day: 1.00     Years: 5.00     Pack years: 5.00     Types: Cigarettes     Smokeless tobacco: Never Used   Substance Use Topics     Alcohol use: Yes     Alcohol/week: 0.0 standard drinks     Comment: Binge drinking 2x's a week 1.75 L in two days     Drug use: Yes     Types: Marijuana     Comment: occasionally      Past medical history, past surgical history, medications, allergies, family history, and social history were reviewed with the patient. No additional pertinent items.       Review of Systems   Constitutional: Negative for fever.   HENT: Negative for congestion.    Eyes: Negative for redness.   Respiratory: Negative for shortness of breath.    Cardiovascular: Negative for chest pain.   Gastrointestinal: Negative for abdominal pain.   Genitourinary: Negative for difficulty urinating.   Musculoskeletal: Negative for arthralgias and neck stiffness.   Skin: Negative for color change.   Neurological: Negative for headaches.   Psychiatric/Behavioral: Negative for confusion.     A complete review of systems was performed with pertinent positives and negatives noted in the HPI, and all other systems negative.    Physical Exam   BP: 113/72  Pulse: 99  Temp: 98.4  F (36.9  C)  Resp: 18  Height: 165.1 cm (5' 5\")  Weight: 99.8 kg (220 lb)  SpO2: 96 %  Physical Exam  Constitutional:       General: She is not in acute distress.     Appearance: She is not diaphoretic.   HENT:      Head: Atraumatic.      Mouth/Throat:      Pharynx: No oropharyngeal exudate.   Eyes:      General: No scleral icterus.     Pupils: Pupils are equal, round, and reactive to light.   Cardiovascular:      Rate and Rhythm: Normal rate and regular rhythm.      Heart sounds: Normal heart sounds. No murmur heard.    No friction rub. No gallop.   Pulmonary:      Effort: Pulmonary effort is normal. No respiratory distress.      Breath sounds: Normal breath sounds. No stridor. No wheezing, rhonchi or rales. "   Chest:      Chest wall: No tenderness.   Abdominal:      General: Abdomen is flat. Bowel sounds are normal. There is no distension.      Palpations: Abdomen is soft. There is no mass.      Tenderness: There is no abdominal tenderness. There is no right CVA tenderness, left CVA tenderness, guarding or rebound.      Hernia: No hernia is present.   Musculoskeletal:         General: No tenderness.      Cervical back: Neck supple.   Skin:     General: Skin is warm.      Findings: No rash.   Neurological:      General: No focal deficit present.      Cranial Nerves: No cranial nerve deficit.   Psychiatric:         Attention and Perception: Attention normal.         Mood and Affect: Mood is anxious and depressed.         Speech: Speech normal.         Behavior: Behavior is slowed and withdrawn.         Thought Content: Thought content is not paranoid or delusional. Thought content does not include homicidal or suicidal ideation. Thought content does not include homicidal or suicidal plan.         ED Course      Procedures         Mental Health Risk Assessment      PSS-3    Date and Time Over the past 2 weeks have you felt down, depressed, or hopeless? Over the past 2 weeks have you had thoughts of killing yourself? Have you ever attempted to kill yourself? When did this last happen? User   09/14/22 1813 yes yes no -- AMBER      C-SSRS ([place])    Date and Time Q1 Wished to be Dead (Past Month) Q2 Suicidal Thoughts (Past Month) Q3 Suicidal Thought Method Q4 Suicidal Intent without Specific Plan Q5 Suicide Intent with Specific Plan Q6 Suicide Behavior (Lifetime) Within the Past 3 Months? RETIRED: Level of Risk per Screen Screening Not Complete User   09/14/22 2028 no no no no no no -- -- -- RL              Suicide assessment completed by mental health (D.E.C., LCSW, etc.)       No results found for any visits on 09/14/22.  Medications - No data to display     Assessments & Plan (with Medical Decision Making)   Polysubstance  abuse and depression anxiety, initially wish to be admitted to Detox but  Changed her mind and just wish to follow up with CD program as outlined by the , yash VEGA DENEEN, will discharge with CD program resources, if change her mind, return to ED for Detox, clinically sober at the time of discharge with [facility] well.    I have reviewed the nursing notes. I have reviewed the findings, diagnosis, plan and need for follow up with the patient.    Discharge Medication List as of 9/14/2022  8:07 PM          Final diagnoses:   Substance abuse (H)   Mood disorder (H)   I, Mechelle Thomson, am serving as a trained medical scribe to document services personally performed by Flores Mccarthy MD, based on the provider's statements to me.      I, Flores Mccarthy MD, was physically present and have reviewed and verified the accuracy of this note documented by Mechelle Thomson.     --  Flores Mccarthy MD  [facility] EMERGENCY DEPARTMENT  9/14/2022     Flores Mccarthy MD  09/14/22 1682

## 2022-09-19 ENCOUNTER — TELEPHONE (OUTPATIENT)
Dept: BEHAVIORAL HEALTH | Facility: CLINIC | Age: 32
End: 2022-09-19

## 2022-09-19 NOTE — TELEPHONE ENCOUNTER
Patient have a video appointment today at 8am with [facility]. Writer placed a call to check in patient this morning. Unable to get a hold of patient. Writer left a voicemail with writer's call back number.

## 2022-10-15 ENCOUNTER — HEALTH MAINTENANCE LETTER (OUTPATIENT)
Age: 32
End: 2022-10-15

## 2022-12-03 ENCOUNTER — HEALTH MAINTENANCE LETTER (OUTPATIENT)
Age: 32
End: 2022-12-03

## 2023-01-26 NOTE — PROGRESS NOTES
"[facility] PHYSICAL EXAMINATION FORM    Patient: Veronique Oliva    YOB: 1990  Sex:  female    Date of Exam: 1/27/23  MyChart Status: Activated    Arrival Time: 08 56 AM  Departure Time: 10 45 AM    Vitals: /84   Pulse 73   Temp 97.9  F (36.6  C) (Oral)   Ht 1.679 m (5' 6.1\")   Wt 120.2 kg (265 lb)   SpO2 99%   BMI 42.64 kg/m      Patient Concerns:   Chief Complaint   Patient presents with     Physical     Wants to check to see if she has diabetes     PMH of endometriosis, PCOS, gastric ulcers    Veronique Oliva presents to the clinic today for a [facility] physical exam. They have been there since Sept 2022. This is the patient's tenth time in treatment. Reports their drug of choice is/was alcohol , last used Sept 15th. Patient denies a history of IVDU and denies a history of incarceration for approximately [NA]. Patient's living situation prior to treatment: lives with family. Report their last episode of UPIC was Sept 2022, no active genitourinary symptoms. Veronique Oliva denies current chest pain, palpitations, SOB, cough, fever, chills, malaise, N/V/D, night sweats, unintentional weight loss, or abdominal pain.    Current health complaints: Wants to be screened for diabetes    Wants STI testing    Thinks she may have tested positive for HPV  Last PAP 2021 NIL    PHQ-2 Score:    PHQ-2 ( 1999 Pfizer) 11/18/2016   Q1: Little interest or pleasure in doing things 3   Q2: Feeling down, depressed or hopeless 3   PHQ-2 Score 6      PHQ-9 score:    PHQ 6/29/2017   PHQ-9 Total Score 26   Q9: Thoughts of better off dead/self-harm past 2 weeks Nearly every day       Medications:   Current Outpatient Medications   Medication Sig Dispense Refill     buPROPion (WELLBUTRIN SR) 100 MG 12 hr tablet Take 1 tablet (100 mg) by mouth every morning (Patient not taking: Reported on 8/3/2017) 30 tablet 1     predniSONE (DELTASONE) 20 MG tablet   0     sertraline " (ZOLOFT) 50 MG tablet Take 1/2 tab (25 mg) for 1 week, then 1 tab (50 mg) for 1-2 weeks, then 2 tabs (100 mg) daily, for depression and anxiety prevention. (Patient not taking: Reported on 8/3/2017) 60 tablet 1       ROS:  Review Of Systems  See HPI    General Physical Exam:  Constitutional:   awake, alert, cooperative, no apparent distress, and appears stated age   Eyes:   Lids and lashes normal, pupils equal, round and reactive to light, extra ocular muscles intact, sclera clear, conjunctiva normal   ENT:   Normocephalic, without obvious abnormality, atraumatic, sinuses nontender on palpation, external ears without lesions, oral pharynx with moist mucous membranes, tonsils without erythema or exudates, gums normal and good dentition.  No signs of oral abscess, erythema, or edema in oral cavity.    Neck:   Supple, symmetrical, trachea midline, no adenopathy, thyroid symmetric, not enlarged and no tenderness, skin normal   Hematologic / Lymphatic:   no cervical lymphadenopathy   Back:   Symmetric, no curvature, spinous processes are non-tender on palpation, paraspinous muscles are non-tender on palpation, no costal vertebral tenderness   Lungs:   No increased work of breathing, good air exchange, clear to auscultation bilaterally, no crackles or wheezing   Cardiovascular:   Normal apical impulse, regular rate and rhythm, normal S1 and S2, no S3 or S4, and no murmur noted   Abdomen:   No scars, normal bowel sounds, soft, non-distended, non-tender, no masses palpated, no hepatosplenomegally   Chest / Breast:   Patient declined exam   Genitounirinary:   Pelvic exam not indicated. PAP up to date   Musculoskeletal:   There is no redness, warmth, or swelling of the joints.  Full range of motion noted.  Motor strength is 5 out of 5 all extremities bilaterally.  Tone is normal.   Neurologic:   Awake, alert, oriented to name, place and time.  Cranial nerves II-XII are grossly intact.  Motor is 5 out of 5 bilaterally.   Cerebellar finger to nose, heel to shin intact.  Sensory is intact.  Babinski down going, Romberg negative, and gait is normal.   Neuropsychiatric:   General: normal, calm and normal eye contact   Skin:   normal skin color, texture, turgor     All labs required for [facility] will be completed during this visit: HIV, Hepatitis A (IgM &IgG), Hepatitis B (IgM &IgG), Hepatitis C, Quantiferon Gold, Pregnancy Test     Allergies:   Allergies   Allergen Reactions     Cat Hair Extract Itching     Itchy throat and ears     Cats Itching       Are there any conditions that may endanger the health of the staff or Clients in our residential program? No     Is there any reason why this applicant should not assist in the preparation of food? No    This applicant is okay to admit for services to [facility]? Yes     The above client is a mutual patient at [facility], and the [facility] Practitioners Clinic would like our patient to continue taking her medication as prescribed upon discharging from [facility].     I authorize the above patient to take all of her medication with her upon discharging from [facility]. Yes     Diagnoses:     (Z00.00) Routine history and physical examination of adult  (primary encounter diagnosis)  Plan: Basic metabolic panel, Hemoglobin A1c, Lipid         panel  - Physical exam unremarkable  - No s/sx of infectious or communicable diseases   - Vocalizes commitment to treatment center's program to maintain sobriety    (Z59.3) Person living in residential institution  Plan: Hepatitis Antibody A IgG, Hepatitis A antibody         IgM, Hepatitis B Surface Antibody, Hepatitis B         surface antigen, Quantiferon TB Gold Plus, HCG         qualitative urine, Hepatitis C Screen Reflex to        HCV RNA Quant and Genotype    (Z11.3) Routine screening for STI (sexually transmitted infection)  Plan:  Treponema Abs w Reflex to RPR and Titer, HIV         Antigen Antibody Combo    (K08.89) Tooth pain  (K02.9) Dental caries  Comment: Upper right tooth pain persistent for months. Needs to see dentist ASAP  Plan: Dental Referral    Medication Changes: MEDICATIONS:        - Continue other medications without change    Referrals   Referral to Dental - Please call [phone] to schedule your appointment    Follow up plan   Follow up as needed    Jaki Diaz NP     Fax completed forms to: [phone]

## 2023-01-27 ENCOUNTER — OFFICE VISIT (OUTPATIENT)
Dept: FAMILY MEDICINE | Facility: CLINIC | Age: 33
End: 2023-01-27
Payer: COMMERCIAL

## 2023-01-27 VITALS
SYSTOLIC BLOOD PRESSURE: 119 MMHG | BODY MASS INDEX: 42.59 KG/M2 | WEIGHT: 265 LBS | DIASTOLIC BLOOD PRESSURE: 84 MMHG | TEMPERATURE: 97.9 F | HEIGHT: 66 IN | HEART RATE: 73 BPM | OXYGEN SATURATION: 99 %

## 2023-01-27 DIAGNOSIS — K02.9 DENTAL CARIES: ICD-10-CM

## 2023-01-27 DIAGNOSIS — Z78.9 PERSON LIVING IN RESIDENTIAL INSTITUTION: ICD-10-CM

## 2023-01-27 DIAGNOSIS — K08.89 TOOTH PAIN: ICD-10-CM

## 2023-01-27 DIAGNOSIS — Z00.00 ROUTINE HISTORY AND PHYSICAL EXAMINATION OF ADULT: Primary | ICD-10-CM

## 2023-01-27 DIAGNOSIS — Z11.3 ROUTINE SCREENING FOR STI (SEXUALLY TRANSMITTED INFECTION): ICD-10-CM

## 2023-01-27 LAB
ANION GAP SERPL CALCULATED.3IONS-SCNC: 12 MMOL/L (ref 7–15)
BUN SERPL-MCNC: 16.9 MG/DL (ref 6–20)
CALCIUM SERPL-MCNC: 9.2 MG/DL (ref 8.6–10)
CHLORIDE SERPL-SCNC: 105 MMOL/L (ref 98–107)
CHOLEST SERPL-MCNC: 236 MG/DL
CREAT SERPL-MCNC: 0.99 MG/DL (ref 0.51–0.95)
DEPRECATED HCO3 PLAS-SCNC: 22 MMOL/L (ref 22–29)
GFR SERPL CREATININE-BSD FRML MDRD: 78 ML/MIN/1.73M2
GLUCOSE SERPL-MCNC: 100 MG/DL (ref 70–99)
HCG UR QL: NEGATIVE
HDLC SERPL-MCNC: 37 MG/DL
LDLC SERPL CALC-MCNC: 172 MG/DL
NONHDLC SERPL-MCNC: 199 MG/DL
POTASSIUM SERPL-SCNC: 4.2 MMOL/L (ref 3.4–5.3)
SODIUM SERPL-SCNC: 139 MMOL/L (ref 136–145)
TRIGL SERPL-MCNC: 136 MG/DL

## 2023-01-27 PROCEDURE — 86481 TB AG RESPONSE T-CELL SUSP: CPT | Mod: ORL

## 2023-01-27 PROCEDURE — 86803 HEPATITIS C AB TEST: CPT | Mod: ORL

## 2023-01-27 PROCEDURE — 83036 HEMOGLOBIN GLYCOSYLATED A1C: CPT | Mod: ORL

## 2023-01-27 PROCEDURE — 86709 HEPATITIS A IGM ANTIBODY: CPT | Mod: ORL

## 2023-01-27 PROCEDURE — 80048 BASIC METABOLIC PNL TOTAL CA: CPT | Mod: ORL

## 2023-01-27 PROCEDURE — 86706 HEP B SURFACE ANTIBODY: CPT | Mod: ORL

## 2023-01-27 PROCEDURE — 86708 HEPATITIS A ANTIBODY: CPT | Mod: ORL

## 2023-01-27 PROCEDURE — 86780 TREPONEMA PALLIDUM: CPT | Mod: ORL

## 2023-01-27 PROCEDURE — 80061 LIPID PANEL: CPT | Mod: ORL

## 2023-01-27 PROCEDURE — 87389 HIV-1 AG W/HIV-1&-2 AB AG IA: CPT | Mod: ORL

## 2023-01-27 PROCEDURE — 87340 HEPATITIS B SURFACE AG IA: CPT | Mod: ORL

## 2023-01-27 ASSESSMENT — ANXIETY QUESTIONNAIRES
6. BECOMING EASILY ANNOYED OR IRRITABLE: NEARLY EVERY DAY
5. BEING SO RESTLESS THAT IT IS HARD TO SIT STILL: NEARLY EVERY DAY
IF YOU CHECKED OFF ANY PROBLEMS ON THIS QUESTIONNAIRE, HOW DIFFICULT HAVE THESE PROBLEMS MADE IT FOR YOU TO DO YOUR WORK, TAKE CARE OF THINGS AT HOME, OR GET ALONG WITH OTHER PEOPLE: SOMEWHAT DIFFICULT
GAD7 TOTAL SCORE: 13
2. NOT BEING ABLE TO STOP OR CONTROL WORRYING: SEVERAL DAYS
GAD7 TOTAL SCORE: 13
7. FEELING AFRAID AS IF SOMETHING AWFUL MIGHT HAPPEN: NOT AT ALL
3. WORRYING TOO MUCH ABOUT DIFFERENT THINGS: SEVERAL DAYS
1. FEELING NERVOUS, ANXIOUS, OR ON EDGE: NEARLY EVERY DAY

## 2023-01-27 ASSESSMENT — PATIENT HEALTH QUESTIONNAIRE - PHQ9
5. POOR APPETITE OR OVEREATING: MORE THAN HALF THE DAYS
SUM OF ALL RESPONSES TO PHQ QUESTIONS 1-9: 10

## 2023-01-27 NOTE — NURSING NOTE
"ROOM:2  [facility]    Preferred Name: Veronique     How did you hear about us?  [facility]    32 year [facility]  Chief Complaint   Patient presents with     Physical     Wants to check to see if she has diabetes       Blood pressure 119/84, pulse 73, temperature 97.9  F (36.6  C), temperature source Oral, height 1.679 m (5' 6.1\"), weight 120.2 kg (265 lb), SpO2 99 %, not currently breastfeeding. Body mass index is 42.64 kg/m .  BP completed using cuff size:        Patient Active Problem List   Diagnosis     Recurrent major depressive disorder (H)     Anxiety       Wt Readings from Last 2 Encounters:   01/27/23 120.2 kg (265 lb)   09/14/22 99.8 kg (220 lb)     BP Readings from Last 3 Encounters:   01/27/23 119/84   09/14/22 109/66   10/23/19 (!) 141/69       Allergies   Allergen Reactions     Cat Hair Extract Itching     Itchy throat and ears     Cats Itching       Current Outpatient Medications   Medication     buPROPion (WELLBUTRIN SR) 100 MG 12 hr tablet     oxyCODONE-acetaminophen (PERCOCET) 5-325 MG per tablet     predniSONE (DELTASONE) 20 MG tablet     sertraline (ZOLOFT) 50 MG tablet     No current facility-administered medications for this visit.       Social History     Tobacco Use     Smoking status: Every Day     Packs/day: 1.00     Years: 5.00     Pack years: 5.00     Types: Cigarettes     Smokeless tobacco: Never   Substance Use Topics     Alcohol use: Yes     Alcohol/week: 0.0 standard drinks     Comment: Binge drinking 2x's a week 1.75 L in two days     Drug use: Yes     Types: Marijuana     Comment: occasionally       Honoring Choices - Health Care Directive Guide offered to patient at time of visit.    Health Maintenance Due   Topic Date Due     NICOTINE/TOBACCO CESSATION COUNSELING Q 1 YR  Never done     ADVANCE CARE PLANNING  Never done     Pneumococcal Vaccine: Pediatrics (0 to 5 Years) and At-Risk Patients (6 to 64 Years) (1 - PCV) Never done     HIV SCREENING  Never done     HEPATITIS C SCREENING  " Never done     URINE DRUG SCREEN  02/05/2017     PAP  12/01/2017     PHQ-9  06/29/2018     COVID-19 Vaccine (2 - Booster for Brittany series) 06/06/2021     INFLUENZA VACCINE (1) 09/01/2022     YEARLY PREVENTIVE VISIT  10/05/2022       Immunization History   Administered Date(s) Administered     COVID-19 Vaccine (Brittany) 04/11/2021     HPV9 06/29/2017     TDAP Vaccine (Adacel) 04/06/2004, 06/29/2017       No results found for: PAP    Recent Labs   Lab Test 02/05/16  0936   ALT 42   CR 0.80   GFRESTIMATED 89   GFRESTBLACK >90   GFR Calc     ALBUMIN 3.6   POTASSIUM 3.7   TSH 2.10       PHQ-2 ( 1999 Pfizer) 11/18/2016   Q1: Little interest or pleasure in doing things 3   Q2: Feeling down, depressed or hopeless 3   PHQ-2 Score 6       PHQ-9 SCORE 11/18/2016 12/23/2016 6/29/2017   PHQ-9 Total Score 22 14 26       MARCO ANTONIO-7 SCORE 11/18/2016 12/23/2016 6/29/2017   Total Score 19 15 21       No flowsheet data found.    Imelda Butler    January 27, 2023 9:36 AM

## 2023-01-27 NOTE — Clinical Note
We were so busy on Friday AM that her medications never got entered. I made a copy of her med list and gave it to Mercedes to enter it but I think it got shredded. Have we heard back from [facility] about her med list? I cant sign my note until I have reviewed her med list.  Thanks!

## 2023-01-28 LAB
HBA1C MFR BLD: 5.4 %
T PALLIDUM AB SER QL: NONREACTIVE

## 2023-01-30 LAB
GAMMA INTERFERON BACKGROUND BLD IA-ACNC: 0 IU/ML
HAV IGG SER QL IA: NONREACTIVE
HAV IGM SERPL QL IA: NONREACTIVE
HBV SURFACE AB SERPL IA-ACNC: 40.77 M[IU]/ML
HBV SURFACE AB SERPL IA-ACNC: REACTIVE M[IU]/ML
HBV SURFACE AG SERPL QL IA: NONREACTIVE
HCV AB SERPL QL IA: NONREACTIVE
HIV 1+2 AB+HIV1 P24 AG SERPL QL IA: NONREACTIVE
M TB IFN-G BLD-IMP: POSITIVE
M TB IFN-G CD4+ BCKGRND COR BLD-ACNC: 10 IU/ML
MITOGEN IGNF BCKGRD COR BLD-ACNC: 0 IU/ML
MITOGEN IGNF BCKGRD COR BLD-ACNC: 1.38 IU/ML
QUANTIFERON MITOGEN: 10 IU/ML
QUANTIFERON NIL TUBE: 0 IU/ML
QUANTIFERON TB1 TUBE: 0 IU/ML
QUANTIFERON TB2 TUBE: 1.38

## 2023-01-31 RX ORDER — GABAPENTIN 300 MG/1
300 CAPSULE ORAL 2 TIMES DAILY PRN
COMMUNITY
End: 2023-03-24

## 2023-01-31 RX ORDER — FAMOTIDINE 10 MG
10 TABLET ORAL 2 TIMES DAILY PRN
COMMUNITY
End: 2023-05-25

## 2023-01-31 RX ORDER — BUPROPION HYDROCHLORIDE 150 MG/1
150 TABLET, EXTENDED RELEASE ORAL EVERY MORNING
COMMUNITY

## 2023-01-31 RX ORDER — TOPIRAMATE SPINKLE 25 MG/1
25 CAPSULE ORAL AT BEDTIME
COMMUNITY
End: 2023-03-24

## 2023-01-31 RX ORDER — CLONIDINE HYDROCHLORIDE 0.1 MG/1
0.1 TABLET ORAL 3 TIMES DAILY PRN
COMMUNITY

## 2023-01-31 RX ORDER — ACETAMINOPHEN 500 MG
500-1000 TABLET ORAL EVERY 6 HOURS PRN
COMMUNITY

## 2023-01-31 RX ORDER — AMOXICILLIN 500 MG/1
500 TABLET, FILM COATED ORAL 2 TIMES DAILY
COMMUNITY
End: 2023-03-24

## 2023-01-31 RX ORDER — SERTRALINE HYDROCHLORIDE 100 MG/1
100 TABLET, FILM COATED ORAL 2 TIMES DAILY
COMMUNITY
End: 2023-03-24

## 2023-02-01 ENCOUNTER — TELEPHONE (OUTPATIENT)
Dept: FAMILY MEDICINE | Facility: CLINIC | Age: 33
End: 2023-02-01

## 2023-02-01 DIAGNOSIS — R76.12 POSITIVE QUANTIFERON-TB GOLD TEST: Primary | ICD-10-CM

## 2023-02-01 NOTE — TELEPHONE ENCOUNTER
This patient's TB gold was positive. I have ordered a chest XR and placed an order for the patient to see [facility] infectious disease. Please communicate this information to [facility].    Please also schedule her as a telephone visit with me so I can discuss her results with her.    Thanks!    Jaki Diaz NP

## 2023-02-02 NOTE — PROGRESS NOTES
[facility] VIRTUAL VISIT     Patient: Veronique Oliva YOB: 1990 is a 32 year old who is being evaluated via a billable Telephone visit.      How would you like to obtain your AVS? Mail a copy    Date of Exam: 2/03/23      Please be advised that this client resides in a facility in which narcotic medications are not permitted. If pain management is needed, please prescribe an alternative medication.     Veronique Oliva is a 32 year old who presents for the following:    Chief Complaint   Patient presents with     RECHECK     Results      Positive TB gold test  -Pt was [place] the [place]  -No recent travel to high risk countries  -No hemoptysis, night sweats, unexplained weight loss, cough, fever, or chills  -Patient is a current vape user   -Has not scheduled visit with [facility] ID or had CXR completed yet  -No known exposures    Do you need any refills on your Medications today? No    Review Of Systems  CONSTITUTIONAL: no fatigue, no unexpected change in weight  SKIN: no worrisome rashes, no worrisome moles, no worrisome lesions  EYES: no acute vision problems or changes  ENT: no ear problems, no mouth problems, no throat problems  RESP: no significant cough, no shortness of breath  CV: no chest pain, no palpitations, no new or worsening peripheral edema  GI: no nausea, no vomiting, no constipation, no diarrhea    Objective:  Vitals:  No vitals were obtained today due to virtual visit.    GENERAL: healthy, alert and no distress  Remainder of exam unable to be completed to due to virtual visit.    Additional Comments:N/A    Assessment / Plan:  Veronique was seen today for recheck.    Diagnoses and all orders for this visit:    Positive QuantiFERON-TB Gold test  -No specific risk factors for TB. Asymptomatic. No known exposures. Ordered CXR and placed ID referral.     Hyperlipidemia LDL goal <100  -Discussed lifestyle modifications to lower cholesterol. Pt is currently in treatment for alcohol use  disorder and has little control over her diet. Pt will try to make dietary changes and exercise more. Recheck in 1 year.    Discussed negative results for Hep A, B, & C, syphilis, HIV, diabetes, and HCG.    Referrals Made:    Previous referral made for [facility] for positive TB test on 2/1/2023.  [facility]  [address] [place] [id number]  Appointments: [phone]      Follow Up:  Follow up as needed    Medication changed made at today's visit: MEDICATIONS:        - Continue other medications without change    Jaki Diaz NP     Appointment Duration:  Phone call duration: 8 minutes

## 2023-02-03 ENCOUNTER — VIRTUAL VISIT (OUTPATIENT)
Dept: FAMILY MEDICINE | Facility: CLINIC | Age: 33
End: 2023-02-03
Payer: COMMERCIAL

## 2023-02-03 DIAGNOSIS — R76.12 POSITIVE QUANTIFERON-TB GOLD TEST: Primary | ICD-10-CM

## 2023-02-03 DIAGNOSIS — E78.5 HYPERLIPIDEMIA LDL GOAL <100: ICD-10-CM

## 2023-02-08 ENCOUNTER — ANCILLARY PROCEDURE (OUTPATIENT)
Dept: GENERAL RADIOLOGY | Facility: CLINIC | Age: 33
End: 2023-02-08
Payer: COMMERCIAL

## 2023-02-08 DIAGNOSIS — R76.12 POSITIVE QUANTIFERON-TB GOLD TEST: ICD-10-CM

## 2023-02-08 PROCEDURE — 71046 X-RAY EXAM CHEST 2 VIEWS: CPT | Performed by: RADIOLOGY

## 2023-03-24 ENCOUNTER — OFFICE VISIT (OUTPATIENT)
Dept: FAMILY MEDICINE | Facility: CLINIC | Age: 33
End: 2023-03-24
Payer: COMMERCIAL

## 2023-03-24 VITALS
DIASTOLIC BLOOD PRESSURE: 73 MMHG | BODY MASS INDEX: 44.11 KG/M2 | HEART RATE: 74 BPM | TEMPERATURE: 98.1 F | HEIGHT: 66 IN | WEIGHT: 274.5 LBS | OXYGEN SATURATION: 95 % | SYSTOLIC BLOOD PRESSURE: 113 MMHG

## 2023-03-24 DIAGNOSIS — K52.9 CHRONIC DIARRHEA: Primary | ICD-10-CM

## 2023-03-24 DIAGNOSIS — K92.1 BLOOD IN STOOL: ICD-10-CM

## 2023-03-24 LAB
BASOPHILS # BLD AUTO: 0.1 10E3/UL (ref 0–0.2)
BASOPHILS NFR BLD AUTO: 1 %
EOSINOPHIL # BLD AUTO: 0.6 10E3/UL (ref 0–0.7)
EOSINOPHIL NFR BLD AUTO: 5 %
ERYTHROCYTE [DISTWIDTH] IN BLOOD BY AUTOMATED COUNT: 14 % (ref 10–15)
HCT VFR BLD AUTO: 42.1 % (ref 35–47)
HGB BLD-MCNC: 13.2 G/DL (ref 11.7–15.7)
IMM GRANULOCYTES # BLD: 0 10E3/UL
IMM GRANULOCYTES NFR BLD: 0 %
LYMPHOCYTES # BLD AUTO: 3.6 10E3/UL (ref 0.8–5.3)
LYMPHOCYTES NFR BLD AUTO: 29 %
MCH RBC QN AUTO: 28.4 PG (ref 26.5–33)
MCHC RBC AUTO-ENTMCNC: 31.4 G/DL (ref 31.5–36.5)
MCV RBC AUTO: 91 FL (ref 78–100)
MONOCYTES # BLD AUTO: 0.6 10E3/UL (ref 0–1.3)
MONOCYTES NFR BLD AUTO: 5 %
NEUTROPHILS # BLD AUTO: 7.5 10E3/UL (ref 1.6–8.3)
NEUTROPHILS NFR BLD AUTO: 60 %
NRBC # BLD AUTO: 0 10E3/UL
NRBC BLD AUTO-RTO: 0 /100
PLATELET # BLD AUTO: 401 10E3/UL (ref 150–450)
RBC # BLD AUTO: 4.64 10E6/UL (ref 3.8–5.2)
WBC # BLD AUTO: 12.5 10E3/UL (ref 4–11)

## 2023-03-24 PROCEDURE — 83690 ASSAY OF LIPASE: CPT | Mod: ORL

## 2023-03-24 PROCEDURE — 82150 ASSAY OF AMYLASE: CPT | Mod: ORL

## 2023-03-24 PROCEDURE — 80048 BASIC METABOLIC PNL TOTAL CA: CPT | Mod: ORL

## 2023-03-24 PROCEDURE — 85025 COMPLETE CBC W/AUTO DIFF WBC: CPT | Mod: ORL

## 2023-03-24 PROCEDURE — 86364 TISS TRNSGLTMNASE EA IG CLAS: CPT | Mod: ORL

## 2023-03-24 RX ORDER — ESCITALOPRAM OXALATE 10 MG/1
10 TABLET ORAL DAILY
COMMUNITY

## 2023-03-24 RX ORDER — TOPIRAMATE 50 MG/1
50 TABLET, FILM COATED ORAL 2 TIMES DAILY
COMMUNITY

## 2023-03-24 RX ORDER — BUPROPION HYDROCHLORIDE 300 MG/1
TABLET ORAL
COMMUNITY
Start: 2023-01-17

## 2023-03-24 RX ORDER — MIRTAZAPINE 15 MG/1
7.5-15 TABLET, FILM COATED ORAL
COMMUNITY

## 2023-03-24 ASSESSMENT — PATIENT HEALTH QUESTIONNAIRE - PHQ9: SUM OF ALL RESPONSES TO PHQ QUESTIONS 1-9: 13

## 2023-03-24 NOTE — PROGRESS NOTES
"[facility] ACUTE OR FOLLOW UP VISIT    Patient: Veronique Oliva YOB: 1990    Date of Exam: 3/24/23    Arrival Time: 10 20 AM  Departure Time: 11 20 AM    Charge Phone (written on paperwork): [phone]    Please be advised that this client resides in a facility in which narcotic medications are not permitted. If pain management is needed, please prescribe an alternative medication.     Veronique Oliva is a 32 year old who presents for the following    Patient presents with:  Diarrhea: Ongoing, change of color, nausea, feels like punched in stomach    Diarrhea  Struggled with diarrhea her entire life  Significantly worse the last two months  Spends hours in the bathroom each day  Every couple days has nausea for the last 2 months  Vomits 1-2x/week  Had a colonoscopy scheduled in [place] but then she moved for treatment  Has blood in stool 1x/day, sometimes in her stool, on the toilet paper  Abdominal pain 1-2x/weeks, feels like someone punches her in the stomach  Abd pain is primarily epigastric/umblical  Then she gets nauseous after the pain  Has not identified any dietary triggers (fatty foods, dairy, gluten)  Diarrhea occurs immediately after eating  No pattern (before or after meals) with the nausea or abd pain  5-6 stools per day watery/apple sauce (always \"grinch green\")  Patient has IUD    Do you need any refills on your Medications today? No    Review Of Systems  Review Of Systems  See HPI    General Physical Exam:  Vitals: /73   Pulse 74   Temp 98.1  F (36.7  C) (Oral)   Ht 1.666 m (5' 5.6\")   Wt 124.5 kg (274 lb 8 oz)   SpO2 95%   BMI 44.85 kg/m    Physical Exam  Vitals and nursing note reviewed.   Constitutional:       General: She is not in acute distress.     Appearance: Normal appearance. She is not ill-appearing.   HENT:      Head: Normocephalic and atraumatic.   Eyes:      General: Lids are normal.      Conjunctiva/sclera: Conjunctivae normal. "   Pulmonary:      Effort: Pulmonary effort is normal.   Abdominal:      General: Abdomen is flat. Bowel sounds are normal. There is no distension.      Palpations: Abdomen is soft. There is no mass.      Tenderness: There is no abdominal tenderness. There is no guarding or rebound.      Hernia: No hernia is present.   Skin:     Comments: Skin intact with no visible lesions.   Neurological:      General: No focal deficit present.      Mental Status: She is alert and oriented to person, place, and time.      Gait: Gait is intact.   Psychiatric:         Mood and Affect: Mood normal.         Behavior: Behavior normal.         Thought Content: Thought content normal.         Judgment: Judgment normal.       Additional Comments:N/A    Assessment / Plan:    (K52.9) Chronic diarrhea  (primary encounter diagnosis)  (K92.1) Blood in stool  Plan: CBC with platelets differential, Tissue         transglutaminase geraldine IgA and IgG, Adult GI          Referral - Procedure Only, Basic         metabolic panel, Amylase, Lipase        Hx of polysubstance abuse, including ETOH use disorder. Currently in inpatient chemical dependency program- sober for 6 months. Hx of chronic diarrhea, abdominal pain, blood in stool, and nausea for multiple years. Diarrhea, abd pain, and nausea worse the last 2 months. Considered acute causes of abd pain including cholecystitis, appendicitis, and pancreatitis. Low suspicion as symptoms appear more chronic in nature, patient in NAD and nontoxic, abdominal exam unremarkable, and patient rarely has nausea/vomiting. CBC, amylase and lipase pending. Suspect a chronic cause of diarrhea/abdominal pain such as IBS, IBD, celiac disease, or lactose intolerance. Ordered colonoscopy as pt reports intermittent small amounts of pedro blood in stool. Antibody testing for celiacs pending. If lab results are inconclusive, will place GI referral.     Referrals Made:   Referral to Gastroenterology - If you have not  heard from the scheduling office within 2 business days, please call [phone]  If a referral was made to a [facility] and you don't get a call from central scheduling please call [phone].  If a Mammogram was ordered for you at [facility] call [phone] to schedule or change your appointment.  If you had an XRay/CT/Ultrasound/MRI ordered the number is [phone] to schedule or change your radiology appointment.     Follow up as needed    Medication changes made at today's visit: MEDICATIONS:        - Continue other medications without change    Jaki Diaz, NP

## 2023-03-24 NOTE — NURSING NOTE
"ROOM:2  KRYSTIAN SINGLETARY    Preferred Name: Veronique     How did you hear about us?  Current Patient    32 year old  Chief Complaint   Patient presents with     Diarrhea     Ongoing, change of color, nausea, feels like punched in stomach       Blood pressure 113/73, pulse 74, temperature 98.1  F (36.7  C), temperature source Oral, height 1.666 m (5' 5.6\"), weight 124.5 kg (274 lb 8 oz), SpO2 95 %, not currently breastfeeding. Body mass index is 44.85 kg/m .  BP completed using cuff size:        Patient Active Problem List   Diagnosis     Recurrent major depressive disorder (H)     Anxiety       Wt Readings from Last 2 Encounters:   03/24/23 124.5 kg (274 lb 8 oz)   01/27/23 120.2 kg (265 lb)     BP Readings from Last 3 Encounters:   03/24/23 113/73   01/27/23 119/84   09/14/22 109/66       Allergies   Allergen Reactions     Cat Hair Extract Itching     Itchy throat and ears     Cats Itching       Current Outpatient Medications   Medication     acetaminophen (TYLENOL) 500 MG tablet     Acetaminophen-Pamabrom (MIDOL CAFFEINE FREE) 500-25 MG TABS     buPROPion (WELLBUTRIN SR) 150 MG 12 hr tablet     buPROPion (WELLBUTRIN XL) 300 MG 24 hr tablet     cholecalciferol (VITAMIN D3) 125 mcg (5000 units) capsule     cloNIDine (CATAPRES) 0.1 MG tablet     escitalopram (LEXAPRO) 10 MG tablet     famotidine (PEPCID) 10 MG tablet     hydrOXYzine (VISTARIL) 50 MG capsule     levonorgestrel (MIRENA) 20 MCG/DAY IUD     MELATONIN GUMMIES PO     mirtazapine (REMERON) 15 MG tablet     multivitamin w/minerals (THERA-VIT-M) tablet     topiramate (TOPAMAX) 50 MG tablet     amoxicillin (AMOXIL) 500 MG tablet     citalopram (CELEXA) 10 MG tablet     gabapentin (NEURONTIN) 300 MG capsule     naltrexone (DEPADE/REVIA) 50 MG tablet     sertraline (ZOLOFT) 100 MG tablet     topiramate (TOPAMAX) 25 MG capsule     No current facility-administered medications for this visit.       Social History     Tobacco Use     Smoking status: Former     " Packs/day: 1.00     Years: 5.00     Pack years: 5.00     Types: Cigarettes     Smokeless tobacco: Never   Substance Use Topics     Alcohol use: Yes     Alcohol/week: 0.0 standard drinks     Comment: Binge drinking 2x's a week 1.75 L in two days     Drug use: Not Currently     Types: Marijuana       Honoring Choices - Health Care Directive Guide offered to patient at time of visit.    Health Maintenance Due   Topic Date Due     ADVANCE CARE PLANNING  Never done     Pneumococcal Vaccine: Pediatrics (0 to 5 Years) and At-Risk Patients (6 to 64 Years) (1 - PCV) Never done     URINE DRUG SCREEN  02/05/2017     COVID-19 Vaccine (3 - Booster for Brittany series) 01/08/2022     INFLUENZA VACCINE (1) 09/01/2022     PAP  07/31/2023       Immunization History   Administered Date(s) Administered     COVID-19 Vaccine (Brittany) 04/11/2021     HPV9 06/29/2017     TDAP Vaccine (Adacel) 04/06/2004, 06/29/2017       No results found for: PAP    Recent Labs   Lab Test 01/27/23  1051 08/31/22  1604 02/05/16  0936   A1C 5.4  --   --    *  --   --    HDL 37*  --   --    TRIG 136  --   --    ALT  --  46* 42   CR 0.99* 0.78 0.80   GFRESTIMATED 78 >90 89   GFRESTBLACK  --   --  >90  African American GFR Calc     ALBUMIN  --  4.4 3.6   POTASSIUM 4.2 4.1 3.7   TSH  --   --  2.10       PHQ-2 ( 1999 Pfizer) 3/24/2023 2/3/2023   Q1: Little interest or pleasure in doing things 1 1   Q2: Feeling down, depressed or hopeless 2 1   PHQ-2 Score 3 2       PHQ-9 SCORE 12/23/2016 6/29/2017 1/27/2023 3/24/2023   PHQ-9 Total Score 14 26 10 13       MARCO ANTONIO-7 SCORE 12/23/2016 6/29/2017 1/27/2023   Total Score 15 21 13       No flowsheet data found.    Mercedes Francisco CMA    March 24, 2023 10:35 AM

## 2023-03-25 LAB
AMYLASE SERPL-CCNC: 20 U/L (ref 28–100)
ANION GAP SERPL CALCULATED.3IONS-SCNC: 15 MMOL/L (ref 7–15)
BUN SERPL-MCNC: 10.2 MG/DL (ref 6–20)
CALCIUM SERPL-MCNC: 9.7 MG/DL (ref 8.6–10)
CHLORIDE SERPL-SCNC: 109 MMOL/L (ref 98–107)
CREAT SERPL-MCNC: 0.83 MG/DL (ref 0.51–0.95)
DEPRECATED HCO3 PLAS-SCNC: 16 MMOL/L (ref 22–29)
GFR SERPL CREATININE-BSD FRML MDRD: >90 ML/MIN/1.73M2
GLUCOSE SERPL-MCNC: 118 MG/DL (ref 70–99)
LIPASE SERPL-CCNC: 29 U/L (ref 13–60)
POTASSIUM SERPL-SCNC: 3.8 MMOL/L (ref 3.4–5.3)
SODIUM SERPL-SCNC: 140 MMOL/L (ref 136–145)

## 2023-03-27 LAB
TTG IGA SER-ACNC: 0.2 U/ML
TTG IGG SER-ACNC: <0.6 U/ML

## 2023-03-28 ENCOUNTER — TELEPHONE (OUTPATIENT)
Dept: GASTROENTEROLOGY | Facility: CLINIC | Age: 33
End: 2023-03-28
Payer: COMMERCIAL

## 2023-03-28 NOTE — TELEPHONE ENCOUNTER
Screening Questions  BLUE  KIND OF PREP RED  LOCATION [review exclusion criteria] GREEN  SEDATION TYPE        Y Are you active on mychart?       KRYSTIAN SINGLETARY Ordering/Referring Provider?        [employer]ARE What type of coverage do you have?      N Have you had a positive covid test in the last 14 days?     44.8 1. BMI  [BMI 40+ - review exclusion criteria]    Y  2. Are you able to give consent for your medical care? [IF NO,RN REVIEW]          N  3. Are you taking any prescription pain medications on a routine schedule   (ex narcotics: oxycodone, roxicodone, oxycontin,  and percocet)? [RN Review]          3a. EXTENDED PREP What kind of prescription?     N 4. Do you have any chemical dependencies such as alcohol, street drugs, or methadone?        **If yes 3- 5 , please schedule with MAC sedation.**          IF YES TO ANY 6 - 10 - HOSPITAL SETTING ONLY.     N 6.   Do you need assistance transferring?     N 7.   Have you had a heart or lung transplant?    N 8.   Are you currently on dialysis?   N 9.   Do you use daily home oxygen?   N 10. Do you take nitroglycerin?   10a.  If yes, how often?     11. [FEMALES]  N Are you currently pregnant?    11a.  If yes, how many weeks? [ Greater than 12 weeks, OR NEEDED]    N 12. Do you have Pulmonary Hypertension? *NEED PAC APPT AT [facility] w/ MAC*     N 13. [review exclusion criteria]  Do you have any implantable devices in your body (pacemaker, defib, LVAD)?    N 14. In the past 6 months, have you had any heart related issues including cardiomyopathy or heart attack?     14a.  If yes, did it require cardiac stenting if so when?     N 15. Have you had a stroke or Transient ischemic attack (TIA - aka  mini stroke ) within 6 months?      N 16. Do you have mod to severe Obstructive Sleep Apnea?  [Hospital only]    N 17. Do you have SEVERE AND UNCONTROLLED asthma? *NEED PAC APPT AT [facility] w/MAC*     18. Are you currently taking any blood thinners?     18a. No. Continue to 19.   18b.  "Yes/no Blood Thinner: No. Inform patient to \"follow up w/ ordering provider for bridging instructions.\"    N 19. Do you take the medication Phentermine?    19a. If yes, \"Hold for 7 days before procedure.  Please consult your prescribing provider if you have questions about holding this medication.\"     N  20. Do you have chronic kidney disease?      N  21. Do you have a diagnosis of diabetes?     N  22. On a regular basis do you go 3-5 days between bowel movements?      23. Preferred LOCAL Pharmacy for Pre Prescription    [ LIST ONLY ONE PHARMACY]        [facility] [id number] - [place], [place] - [address]    - CLOSING REMINDERS -    Informed patient they will need an adult    Cannot take any type of public or medical transportation alone    Conscious Sedation- Needs  for 6 hours after the procedure       MAC/General-Needs  for 24 hours after procedure    Pre-Procedure Cov[facility] test to be completed [facility]]    Confirmed Nurse will call to complete assessment       - SCHEDULING DETAILS -  NO Hospital Setting Required? If yes, what is the exclusion?:    SALMA  Surgeon    5/25/2023  Date of Procedure  Lower Endoscopy [Colonoscopy]  Type of Procedure Scheduled  [facility]   GOLYTELY EXTENDED - If you answer yes to questions #1, #3, #22 (De Nikkien and CF pts)Which Colonoscopy Prep was Sent?     MAC PER ORDER Sedation Type     NO PAC / Pre-op Required                 "

## 2023-04-05 ENCOUNTER — OFFICE VISIT (OUTPATIENT)
Dept: FAMILY MEDICINE | Facility: CLINIC | Age: 33
End: 2023-04-05
Payer: COMMERCIAL

## 2023-04-05 VITALS
TEMPERATURE: 98.2 F | DIASTOLIC BLOOD PRESSURE: 78 MMHG | HEIGHT: 66 IN | BODY MASS INDEX: 45 KG/M2 | SYSTOLIC BLOOD PRESSURE: 116 MMHG | HEART RATE: 95 BPM | OXYGEN SATURATION: 97 % | WEIGHT: 280 LBS

## 2023-04-05 DIAGNOSIS — M79.674 GREAT TOE PAIN, RIGHT: Primary | ICD-10-CM

## 2023-04-05 NOTE — PATIENT INSTRUCTIONS
Please allow the patient to do warm water soaks for 15-20 minutes at least 3 times per day. Dry feet thoroughly before putting on socks.     Use tylenol for the pain    Schedule a follow-up telephone or video visit with me if the pain, swelling or pus forms.

## 2023-04-05 NOTE — NURSING NOTE
"ROOM:1  KRYSTIAN SINGLETARY    Preferred Name: Veronique     How did you hear about us?  Other - [facility]    32 year old  Chief Complaint   Patient presents with     rt foot      Poss infection   Pain  Could hardly walk on it   Started yesterday        Blood pressure 116/78, pulse 95, temperature 98.2  F (36.8  C), temperature source Oral, height 1.666 m (5' 5.6\"), weight 127 kg (280 lb), SpO2 97 %, not currently breastfeeding. Body mass index is 45.75 kg/m .  BP completed using cuff size:        Patient Active Problem List   Diagnosis     Recurrent major depressive disorder (H)     Anxiety       Wt Readings from Last 2 Encounters:   04/05/23 127 kg (280 lb)   03/24/23 124.5 kg (274 lb 8 oz)     BP Readings from Last 3 Encounters:   04/05/23 116/78   03/24/23 113/73   01/27/23 119/84       Allergies   Allergen Reactions     Cat Hair Extract Itching     Itchy throat and ears     Cats Itching       Current Outpatient Medications   Medication     acetaminophen (TYLENOL) 500 MG tablet     Acetaminophen-Pamabrom (MIDOL CAFFEINE FREE) 500-25 MG TABS     buPROPion (WELLBUTRIN SR) 150 MG 12 hr tablet     buPROPion (WELLBUTRIN XL) 300 MG 24 hr tablet     cholecalciferol (VITAMIN D3) 125 mcg (5000 units) capsule     cloNIDine (CATAPRES) 0.1 MG tablet     escitalopram (LEXAPRO) 10 MG tablet     famotidine (PEPCID) 10 MG tablet     hydrOXYzine (VISTARIL) 50 MG capsule     levonorgestrel (MIRENA) 20 MCG/DAY IUD     MELATONIN GUMMIES PO     mirtazapine (REMERON) 15 MG tablet     multivitamin w/minerals (THERA-VIT-M) tablet     topiramate (TOPAMAX) 50 MG tablet     No current facility-administered medications for this visit.       Social History     Tobacco Use     Smoking status: Former     Packs/day: 1.00     Years: 5.00     Pack years: 5.00     Types: Cigarettes     Smokeless tobacco: Never   Substance Use Topics     Alcohol use: Yes     Alcohol/week: 0.0 standard drinks of alcohol     Comment: Binge drinking 2x's a week 1.75 L in two days "     Drug use: Not Currently     Types: Marijuana       Honoring Choices - Health Care Directive Guide offered to patient at time of visit.    Health Maintenance Due   Topic Date Due     ADVANCE CARE PLANNING  Never done     Pneumococcal Vaccine: Pediatrics (0 to 5 Years) and At-Risk Patients (6 to 64 Years) (1 - PCV) Never done     URINE DRUG SCREEN  02/05/2017     COVID-19 Vaccine (3 - Booster for Brittany series) 01/08/2022     PAP  07/31/2023       Immunization History   Administered Date(s) Administered     COVID-19 Vaccine (Brittany) 04/11/2021     HPV9 06/29/2017     TDAP Vaccine (Adacel) 04/06/2004, 06/29/2017       No results found for: PAP    Recent Labs   Lab Test 03/24/23  1119 01/27/23  1051 08/31/22  1604 08/31/22  1604 02/05/16  0936   A1C  --  5.4  --   --   --    LDL  --  172*  --   --   --    HDL  --  37*  --   --   --    TRIG  --  136  --   --   --    ALT  --   --   --  46* 42   CR 0.83 0.99*   < > 0.78 0.80   GFRESTIMATED >90 78   < > >90 89   GFRESTBLACK  --   --   --   --  >90  African American GFR Calc     ALBUMIN  --   --   --  4.4 3.6   POTASSIUM 3.8 4.2   < > 4.1 3.7   TSH  --   --   --   --  2.10    < > = values in this interval not displayed.           3/24/2023    10:31 AM 2/3/2023     1:06 PM   PHQ-2 ( 1999 Pfizer)   Q1: Little interest or pleasure in doing things 1 1   Q2: Feeling down, depressed or hopeless 2 1   PHQ-2 Score 3 2           12/23/2016     4:38 PM 6/29/2017     7:21 PM 1/27/2023    10:35 AM 3/24/2023    10:31 AM   PHQ-9 SCORE   PHQ-9 Total Score 14 26 10 13           12/23/2016     4:38 PM 6/29/2017     7:21 PM 1/27/2023    10:35 AM   MARCO ANTONIO-7 SCORE   Total Score 15 21 13            View : No data to display.                Kee Gaona    April 5, 2023 3:31 PM

## 2023-04-05 NOTE — PROGRESS NOTES
"[facility] ACUTE OR FOLLOW UP VISIT    Patient: Veronique Oliva YOB: 1990    Date of Exam: 4/05/23    Arrival Time: 03 29 PM  Departure Time: 04 15 PM    Charge Phone (written on paperwork): [phone]    Please be advised that this client resides in a facility in which narcotic medications are not permitted. If pain management is needed, please prescribe an alternative medication.     Veronique Oliva is a 32 year old who presents for the following    Patient presents with:  rt foot : Poss infection   Was cutting her cuticles on her toenails 3 days ago  Thinks she cut a little too close to her right big toe nail  Feels more swollen and painful to walk since last night  No pus  No fever or chills    Do you need any refills on your Medications today? No    Review Of Systems  Review Of Systems  See HPI    General Physical Exam:  Vitals: /78   Pulse 95   Temp 98.2  F (36.8  C) (Oral)   Ht 1.666 m (5' 5.6\")   Wt 127 kg (280 lb)   SpO2 97%   BMI 45.75 kg/m      Physical Exam  Vitals and nursing note reviewed.   Constitutional:       General: She is not in acute distress.     Appearance: Normal appearance. She is not ill-appearing.   HENT:      Head: Normocephalic and atraumatic.   Eyes:      General: Lids are normal.      Conjunctiva/sclera: Conjunctivae normal.   Pulmonary:      Effort: Pulmonary effort is normal.   Feet:      Comments: Right great toe: mild edema surrounding nail bed, slight erythema, no discharge or drainage. Nailbed tender to palpation. No MCP joint tenderness.  Skin:     Comments: Skin intact with no visible lesions.   Neurological:      General: No focal deficit present.      Mental Status: She is alert and oriented to person, place, and time.      Gait: Gait is intact.   Psychiatric:         Mood and Affect: Mood normal.         Behavior: Behavior normal.         Thought Content: Thought content normal.         Judgment: Judgment normal. "       Additional Comments:N/A    Assessment / Plan:  Veronique was seen today for rt foot .    Diagnoses and all orders for this visit:    Great toe pain, right  -Slight erythema and edema on exam. Nail bed does not appear acutely infected. Considered cellulitis and gout. Pt has not consumed high uric acid foods in the last week. Also tenderness is mainly localized to nailbed. Skin does not appear warm and very minimal erythema surrounding nail bed. Discussed a watch and wait approach. Utilized tylenol and warm water soaks TID for the next 24-48 hours. RTC if symptoms worsening or not improving.    Referrals Made:   NO REFERRALS MADE TODAY  If a referral was made to a [facility] and you don't get a call from central scheduling please call [phone].  If a Mammogram was ordered for you at [facility] call [phone] to schedule or change your appointment.  If you had an XRay/CT/Ultrasound/MRI ordered the number is [phone] to schedule or change your radiology appointment.     Follow-up with me via phone or virtual visit if pain worsens, pus forms, swelling gets worse, or she develops fever or chills    Medication changes made at today's visit: MEDICATIONS:        - Continue other medications without change    Jaki Diaz, TAN

## 2023-05-10 ENCOUNTER — TELEPHONE (OUTPATIENT)
Dept: GASTROENTEROLOGY | Facility: CLINIC | Age: 33
End: 2023-05-10

## 2023-05-10 DIAGNOSIS — R19.7 DIARRHEA: Primary | ICD-10-CM

## 2023-05-10 RX ORDER — BISACODYL 5 MG/1
TABLET, DELAYED RELEASE ORAL
Qty: 4 TABLET | Refills: 0 | Status: SHIPPED | OUTPATIENT
Start: 2023-05-10

## 2023-05-10 NOTE — TELEPHONE ENCOUNTER
Attempted to contact patient regarding upcoming Colonoscopy  procedure on 5/25/23 for pre assessment questions. No answer.     Left message to return call to [phone] #4    Discuss Covid policy and designated  policy.    Pre op exam? N/A    Arrival time: 0845. Procedure time: 0945    [facility]; [address] [place] [address], [address] Floor, [place] [address]    Sedation type: MAC    NSAIDs? No NSAID medications per patient's medication list.  RN will verify with pre-assessment call.    Anticoagulants: No    Electronic implanted devices? No    Diabetic? No    Indication for procedure: chronic diarrhea    Bowel prep recommendation: Extended prep Golytely  d/t BMI    Prep instructions sent via [facility]. Bowel prep script sent to    [facility] [place] [place] [id number] - [place], [place] - [address]      Callie Go RN  Endoscopy Procedure Pre Assessment RN

## 2023-05-18 NOTE — TELEPHONE ENCOUNTER
"Second attempt for pre-assessment prior to upcoming colonoscopy on 5.25.23    No answer. Both [phone] and [phone] had a greeting that stated the wireless customer cannot take a call at this time without the option to leave . Also placed call to temporary number which a person answered saying \"adult and teen challenge\" and said cannot confirm or deny the patient is there.     Per chart review, patient has not accessed [employer] since 2021    Paluine Warren RN  Endoscopy Procedure Pre Assessment RN        "

## 2023-05-23 NOTE — TELEPHONE ENCOUNTER
Third attempt for pre-assessment prior to upcoming colonoscopy     No answer. Phone message states the wireless caller cannot take calls at this time. Message was left on temporary number for patient to call back. [phone] option #4    Tiffany Walker RN  Endoscopy Procedure Pre Assessment RN

## 2023-05-25 ENCOUNTER — HOSPITAL ENCOUNTER (OUTPATIENT)
Facility: AMBULATORY SURGERY CENTER | Age: 33
Discharge: HOME | End: 2023-05-25
Attending: INTERNAL MEDICINE
Payer: COMMERCIAL

## 2023-05-25 ENCOUNTER — ANESTHESIA EVENT (OUTPATIENT)
Dept: SURGERY | Facility: AMBULATORY SURGERY CENTER | Age: 33
End: 2023-05-25
Payer: COMMERCIAL

## 2023-05-25 ENCOUNTER — ANESTHESIA (OUTPATIENT)
Dept: SURGERY | Facility: AMBULATORY SURGERY CENTER | Age: 33
End: 2023-05-25
Payer: COMMERCIAL

## 2023-05-25 VITALS
HEART RATE: 79 BPM | HEIGHT: 66 IN | WEIGHT: 280 LBS | DIASTOLIC BLOOD PRESSURE: 71 MMHG | TEMPERATURE: 97.2 F | BODY MASS INDEX: 45 KG/M2 | SYSTOLIC BLOOD PRESSURE: 114 MMHG | RESPIRATION RATE: 14 BRPM | OXYGEN SATURATION: 97 %

## 2023-05-25 VITALS — HEART RATE: 90 BPM

## 2023-05-25 DIAGNOSIS — K52.9 CHRONIC DIARRHEA: Primary | ICD-10-CM

## 2023-05-25 LAB — COLONOSCOPY: NORMAL

## 2023-05-25 PROCEDURE — 88305 TISSUE EXAM BY PATHOLOGIST: CPT | Mod: TC | Performed by: INTERNAL MEDICINE

## 2023-05-25 PROCEDURE — 45380 COLONOSCOPY AND BIOPSY: CPT

## 2023-05-25 PROCEDURE — 88305 TISSUE EXAM BY PATHOLOGIST: CPT | Mod: 26 | Performed by: STUDENT IN AN ORGANIZED HEALTH CARE EDUCATION/TRAINING PROGRAM

## 2023-05-25 RX ORDER — LIDOCAINE 40 MG/G
CREAM TOPICAL
Status: DISCONTINUED | OUTPATIENT
Start: 2023-05-25 | End: 2023-05-26 | Stop reason: HOSPADM

## 2023-05-25 RX ORDER — ONDANSETRON 2 MG/ML
4 INJECTION INTRAMUSCULAR; INTRAVENOUS
Status: DISCONTINUED | OUTPATIENT
Start: 2023-05-25 | End: 2023-05-26 | Stop reason: HOSPADM

## 2023-05-25 RX ORDER — PROPOFOL 10 MG/ML
INJECTION, EMULSION INTRAVENOUS PRN
Status: DISCONTINUED | OUTPATIENT
Start: 2023-05-25 | End: 2023-05-25

## 2023-05-25 RX ORDER — RAMELTEON 8 MG/1
8 TABLET ORAL AT BEDTIME
COMMUNITY

## 2023-05-25 RX ORDER — SODIUM CHLORIDE, SODIUM LACTATE, POTASSIUM CHLORIDE, CALCIUM CHLORIDE 600; 310; 30; 20 MG/100ML; MG/100ML; MG/100ML; MG/100ML
INJECTION, SOLUTION INTRAVENOUS CONTINUOUS PRN
Status: DISCONTINUED | OUTPATIENT
Start: 2023-05-25 | End: 2023-05-25

## 2023-05-25 RX ORDER — LIDOCAINE HYDROCHLORIDE 20 MG/ML
INJECTION, SOLUTION INFILTRATION; PERINEURAL PRN
Status: DISCONTINUED | OUTPATIENT
Start: 2023-05-25 | End: 2023-05-25

## 2023-05-25 RX ORDER — PROPOFOL 10 MG/ML
INJECTION, EMULSION INTRAVENOUS CONTINUOUS PRN
Status: DISCONTINUED | OUTPATIENT
Start: 2023-05-25 | End: 2023-05-25

## 2023-05-25 RX ADMIN — PROPOFOL 100 MG: 10 INJECTION, EMULSION INTRAVENOUS at 10:08

## 2023-05-25 RX ADMIN — PROPOFOL 100 MG: 10 INJECTION, EMULSION INTRAVENOUS at 10:00

## 2023-05-25 RX ADMIN — LIDOCAINE HYDROCHLORIDE 100 MG: 20 INJECTION, SOLUTION INFILTRATION; PERINEURAL at 10:00

## 2023-05-25 RX ADMIN — PROPOFOL 200 MCG/KG/MIN: 10 INJECTION, EMULSION INTRAVENOUS at 10:00

## 2023-05-25 RX ADMIN — PROPOFOL 100 MG: 10 INJECTION, EMULSION INTRAVENOUS at 10:12

## 2023-05-25 RX ADMIN — SODIUM CHLORIDE, SODIUM LACTATE, POTASSIUM CHLORIDE, CALCIUM CHLORIDE: 600; 310; 30; 20 INJECTION, SOLUTION INTRAVENOUS at 09:45

## 2023-05-25 NOTE — H&P
Veronique Oliva  [id number]  female  32 year old      Reason for procedure/surgery: Chronic diarrhea.    Patient Active Problem List   Diagnosis     Recurrent major depressive disorder (H)     Anxiety       Past Surgical History:    Past Surgical History:   Procedure Laterality Date     CHOLECYSTECTOMY       cyst removed from ovary       DILATION AND CURETTAGE       GALLBLADDER SURGERY       HC REMOVAL OF OVARIAN CYST(S)       UPPER GI ENDOSCOPY  2015       Past Medical History:   Past Medical History:   Diagnosis Date     Alcohol abuse      Anxiety      Depressive disorder      PCOS (polycystic ovarian syndrome)      PTSD (post-traumatic stress disorder)      Suicidal ideation 02/05/2016       Social History:   Social History     Tobacco Use     Smoking status: Former     Packs/day: 1.00     Years: 5.00     Pack years: 5.00     Types: Cigarettes     Smokeless tobacco: Never   Vaping Use     Vaping status: Not on file   Substance Use Topics     Alcohol use: Yes     Alcohol/week: 0.0 standard drinks of alcohol     Comment: Binge drinking 2x's a week 1.75 L in two days       Family History:   Family History   Problem Relation Age of Onset     Mental Illness Mother      Cancer Father      Cancer Maternal Grandfather      Cancer Paternal Grandfather      Brain Cancer Paternal Grandfather      Bladder Cancer Brother      Cancer Brother      Cancer Maternal Uncle      Cancer Maternal Grandmother        Allergies:   Allergies   Allergen Reactions     Cat Hair Extract Itching     Itchy throat and ears     Cats Itching       Active Medications:   Current Outpatient Medications   Medication Sig Dispense Refill     acetaminophen (TYLENOL) 500 MG tablet Take 500-1,000 mg by mouth every 6 hours as needed for mild pain       Acetaminophen-Pamabrom (MIDOL CAFFEINE FREE) 500-25 MG TABS Take by mouth as needed Take 2 capsules with water       bisacodyl (DULCOLAX) 5 MG EC tablet Two days prior to exam take two (2) tablets at 4pm.  One day prior to exam take two (2) tablets at 4pm 4 tablet 0     buPROPion (WELLBUTRIN SR) 150 MG 12 hr tablet Take 150 mg by mouth every morning Take 1 tablet by mouth every morning along with 150 mg       buPROPion (WELLBUTRIN XL) 300 MG 24 hr tablet        cholecalciferol (VITAMIN D3) 125 mcg (5000 units) capsule Take by mouth daily       cloNIDine (CATAPRES) 0.1 MG tablet Take 0.1 mg by mouth 3 times daily as needed       escitalopram (LEXAPRO) 10 MG tablet Take 10 mg by mouth daily       hydrOXYzine (VISTARIL) 50 MG capsule as needed       levonorgestrel (MIRENA) 20 MCG/DAY IUD 1 each by Intrauterine route once       MELATONIN GUMMIES PO Take 10 mg by mouth       mirtazapine (REMERON) 15 MG tablet Take 7.5-15 mg by mouth nightly as needed       multivitamin w/minerals (THERA-VIT-M) tablet Take 1 tablet by mouth daily       omeprazole (PRILOSEC) 20 MG DR capsule Take 20 mg by mouth daily       polyethylene glycol (GOLYTELY) 236 g suspension Take as directed. Two days before your exam fill the first container with water. Cover and shake until mixed well. At 5:00pm drink one 8oz glass every 10-15 minutes until half (1/2) of the first container is empty. Store the remainder in the refrigerator. One day before your exam at 5:00pm drink the second half of the first container until it is gone. Before you go to bed mix the second container with water and put in refrigerator. Six hours before your check in time drink one 8oz glass every 10-15 minutes until half of container is empty. Discard the remainder of solution. 8000 mL 0     ramelteon (ROZEREM) 8 MG tablet Take 8 mg by mouth At Bedtime       topiramate (TOPAMAX) 50 MG tablet Take 50 mg by mouth 2 times daily         Systemic Review:   CONSTITUTIONAL: NEGATIVE for fever, chills, change in weight  ENT/MOUTH: NEGATIVE for ear, mouth and throat problems  RESP: NEGATIVE for significant cough or SOB  CV: NEGATIVE for chest pain, palpitations or peripheral  "edema    Physical Examination:   Vital Signs: /84 (BP Location: Right arm)   Pulse 86   Temp 96.8  F (36  C) (Temporal)   Resp 16   Ht 1.666 m (5' 5.6\")   Wt 127 kg (280 lb)   SpO2 97%   BMI 45.75 kg/m    GENERAL: healthy, alert and no distress  NECK: no adenopathy, no asymmetry, masses, or scars  RESP: lungs clear to auscultation - no rales, rhonchi or wheezes  CV: regular rate and rhythm, normal S1 S2, no S3 or S4, no murmur, click or rub, no peripheral edema and peripheral pulses strong  ABDOMEN: soft, nontender, no hepatosplenomegaly, no masses and bowel sounds normal  MS: no gross musculoskeletal defects noted, no edema    Plan: Appropriate to proceed as scheduled.      Lorena Orellana MD  5/25/2023    PCP:  No Ref-Primary, Physician    "

## 2023-05-25 NOTE — ANESTHESIA POSTPROCEDURE EVALUATION
Patient: Veronique Oliva    Procedure: Procedure(s):  COLONOSCOPY, WITH  BIOPSY       Anesthesia Type:  MAC    Note:  Disposition: Outpatient   Postop Pain Control: Uneventful            Sign Out: Well controlled pain   PONV: No   Neuro/Psych: Uneventful            Sign Out: Acceptable/Baseline neuro status   Airway/Respiratory: Uneventful            Sign Out: Acceptable/Baseline resp. status   CV/Hemodynamics: Uneventful            Sign Out: Acceptable CV status; No obvious hypovolemia; No obvious fluid overload   Other NRE: NONE   DID A NON-ROUTINE EVENT OCCUR? No           Last vitals:  Vitals Value Taken Time   /71 05/25/23 1100   Temp 36.2  C (97.2  F) 05/25/23 1100   Pulse 79 05/25/23 1100   Resp 14 05/25/23 1100   SpO2 97 % 05/25/23 1036       Electronically Signed By: Gerry Luong MD, MD  May 25, 2023  1:51 PM

## 2023-05-25 NOTE — ANESTHESIA PREPROCEDURE EVALUATION
Anesthesia Pre-Procedure Evaluation    Patient: Veronique Oliva   MRN: [id number] : 1990        Procedure : Procedure(s):  Colonoscopy          Past Medical History:   Diagnosis Date     Alcohol abuse      Anxiety      Depressive disorder      PCOS (polycystic ovarian syndrome)      PTSD (post-traumatic stress disorder)      Suicidal ideation 2016      Past Surgical History:   Procedure Laterality Date     CHOLECYSTECTOMY       cyst removed from ovary       DILATION AND CURETTAGE       GALLBLADDER SURGERY       HC REMOVAL OF OVARIAN CYST(S)       UPPER GI ENDOSCOPY        Allergies   Allergen Reactions     Cat Hair Extract Itching     Itchy throat and ears     Cats Itching      Social History     Tobacco Use     Smoking status: Former     Packs/day: 1.00     Years: 5.00     Pack years: 5.00     Types: Cigarettes     Smokeless tobacco: Never   Vaping Use     Vaping status: Not on file   Substance Use Topics     Alcohol use: Yes     Alcohol/week: 0.0 standard drinks of alcohol     Comment: Binge drinking 2x's a week 1.75 L in two days      Wt Readings from Last 1 Encounters:   23 127 kg (280 lb)        Anesthesia Evaluation            ROS/MED HX  ENT/Pulmonary:  - neg pulmonary ROS     Neurologic:  - neg neurologic ROS     Cardiovascular:  - neg cardiovascular ROS     METS/Exercise Tolerance:     Hematologic:       Musculoskeletal:       GI/Hepatic:     (+) bowel prep,     Renal/Genitourinary:       Endo:       Psychiatric/Substance Use:     (+) psychiatric history anxiety and depression     Infectious Disease:       Malignancy:       Other:               OUTSIDE LABS:  CBC:   Lab Results   Component Value Date    WBC 12.5 (H) 2023    WBC 11.9 (H) 2022    HGB 13.2 2023    HGB 14.0 2022    HCT 42.1 2023    HCT 43.9 2022     2023     2022     BMP:   Lab Results   Component Value Date     2023     2023     POTASSIUM 3.8 03/24/2023    POTASSIUM 4.2 01/27/2023    CHLORIDE 109 (H) 03/24/2023    CHLORIDE 105 01/27/2023    CO2 16 (L) 03/24/2023    CO2 22 01/27/2023    BUN 10.2 03/24/2023    BUN 16.9 01/27/2023    CR 0.83 03/24/2023    CR 0.99 (H) 01/27/2023     (H) 03/24/2023     (H) 01/27/2023     COAGS: No results found for: PTT, INR, FIBR  POC:   Lab Results   Component Value Date    HCG Negative 01/27/2023     HEPATIC:   Lab Results   Component Value Date    ALBUMIN 4.4 08/31/2022    PROTTOTAL 7.3 08/31/2022    ALT 46 (H) 08/31/2022    AST 39 (H) 08/31/2022    ALKPHOS 94 08/31/2022    BILITOTAL 0.3 08/31/2022     OTHER:   Lab Results   Component Value Date    A1C 5.4 01/27/2023    YARELY 9.7 03/24/2023    LIPASE 29 03/24/2023    AMYLASE 20 (L) 03/24/2023    TSH 2.10 02/05/2016       Anesthesia Plan    ASA Status:  2      Anesthesia Type: MAC.     - Reason for MAC: immobility needed, straight local not clinically adequate              Consents    Anesthesia Plan(s) and associated risks, benefits, and realistic alternatives discussed. Questions answered and patient/representative(s) expressed understanding.     - Discussed: Risks, Benefits and Alternatives for BOTH SEDATION and the PROCEDURE were discussed     - Discussed with:  Patient      - Extended Intubation/Ventilatory Support Discussed: No.      - Patient is DNR/DNI Status: No    Use of blood products discussed: No .     Postoperative Care    Pain management: IV analgesics, Oral pain medications.   PONV prophylaxis: Ondansetron (or other 5HT-3), Dexamethasone or Solumedrol     Comments:                Gerry Luong MD, MD

## 2023-05-25 NOTE — ANESTHESIA CARE TRANSFER NOTE
Patient: Veronique Oliva    Procedure: Procedure(s):  COLONOSCOPY, WITH  BIOPSY       Diagnosis: Chronic diarrhea [K52.9]  Diagnosis Additional Information: No value filed.    Anesthesia Type:   MAC     Note:    Oropharynx: oropharynx clear of all foreign objects  Level of Consciousness: awake  Oxygen Supplementation: room air    Independent Airway: airway patency satisfactory and stable  Dentition: dentition unchanged  Vital Signs Stable: post-procedure vital signs reviewed and stable    Patient transferred to: Phase II  Comments: VSS and WNL, comfortable, no PONV, report to Mi NATARAJAN  Handoff Report: Identifed the Patient, Identified the Reponsible Provider, Reviewed the pertinent medical history, Discussed the surgical course, Reviewed Intra-OP anesthesia mangement and issues during anesthesia, Set expectations for post-procedure period and Allowed opportunity for questions and acknowledgement of understanding      Vitals:  Vitals Value Taken Time   BP     Temp     Pulse     Resp     SpO2         Electronically Signed By: TYLOR Espinosa CRNA  May 25, 2023  10:32 AM

## 2023-05-26 LAB
PATH REPORT.COMMENTS IMP SPEC: NORMAL
PATH REPORT.COMMENTS IMP SPEC: NORMAL
PATH REPORT.FINAL DX SPEC: NORMAL
PATH REPORT.GROSS SPEC: NORMAL
PATH REPORT.MICROSCOPIC SPEC OTHER STN: NORMAL
PATH REPORT.RELEVANT HX SPEC: NORMAL
PHOTO IMAGE: NORMAL
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
## 2024-05-05 ENCOUNTER — HEALTH MAINTENANCE LETTER (OUTPATIENT)
Age: 34
End: 2024-05-05

## 2024-10-04 ENCOUNTER — HOSPITAL ENCOUNTER (EMERGENCY)
Facility: CLINIC | Age: 34
Discharge: HOME | End: 2024-10-04
Attending: EMERGENCY MEDICINE | Admitting: EMERGENCY MEDICINE
Payer: COMMERCIAL

## 2024-10-04 VITALS
SYSTOLIC BLOOD PRESSURE: 129 MMHG | BODY MASS INDEX: 36.76 KG/M2 | TEMPERATURE: 97.6 F | DIASTOLIC BLOOD PRESSURE: 101 MMHG | RESPIRATION RATE: 16 BRPM | WEIGHT: 225 LBS | HEART RATE: 70 BPM | OXYGEN SATURATION: 97 %

## 2024-10-04 DIAGNOSIS — M54.10 RADICULAR LOW BACK PAIN: ICD-10-CM

## 2024-10-04 PROCEDURE — 99283 EMERGENCY DEPT VISIT LOW MDM: CPT

## 2024-10-04 RX ORDER — METHYLPREDNISOLONE 4 MG/1
TABLET ORAL
Qty: 21 TABLET | Refills: 0 | Status: SHIPPED | OUTPATIENT
Start: 2024-10-04

## 2024-10-04 RX ORDER — OMEPRAZOLE 40 MG/1
40 CAPSULE, DELAYED RELEASE ORAL DAILY
Qty: 14 CAPSULE | Refills: 0 | Status: SHIPPED | OUTPATIENT
Start: 2024-10-04

## 2024-10-04 RX ORDER — ACETAMINOPHEN 500 MG
1000 TABLET ORAL EVERY 8 HOURS PRN
Qty: 60 TABLET | Refills: 0 | Status: SHIPPED | OUTPATIENT
Start: 2024-10-04

## 2024-10-04 RX ORDER — IBUPROFEN 200 MG
400 TABLET ORAL EVERY 8 HOURS PRN
Qty: 60 TABLET | Refills: 0 | Status: SHIPPED | OUTPATIENT
Start: 2024-10-04

## 2024-10-04 RX ORDER — GABAPENTIN 300 MG/1
300 CAPSULE ORAL 3 TIMES DAILY PRN
Qty: 30 CAPSULE | Refills: 0 | Status: SHIPPED | OUTPATIENT
Start: 2024-10-04

## 2024-10-04 ASSESSMENT — COLUMBIA-SUICIDE SEVERITY RATING SCALE - C-SSRS
1. IN THE PAST MONTH, HAVE YOU WISHED YOU WERE DEAD OR WISHED YOU COULD GO TO SLEEP AND NOT WAKE UP?: NO
2. HAVE YOU ACTUALLY HAD ANY THOUGHTS OF KILLING YOURSELF IN THE PAST MONTH?: NO
6. HAVE YOU EVER DONE ANYTHING, STARTED TO DO ANYTHING, OR PREPARED TO DO ANYTHING TO END YOUR LIFE?: NO

## 2024-10-04 ASSESSMENT — ACTIVITIES OF DAILY LIVING (ADL): ADLS_ACUITY_SCORE: 35

## 2024-10-04 NOTE — ED PROVIDER NOTES
Emergency Department Note      History of Present Illness     Chief Complaint   Back Pain      HPI   Veronique Oliva is a 33 year old female who presents with back pain. Pain is located in the left low back/buttock. Occurred suddenly 3 days ago while eating dinner. Pain migrates to the left thigh and right low back as well. Pain is constant but worse with movement. No known recent injuries. Pt has been doing a lot of bending and lifting while helping her family move. Pt had a similar pain 3 months ago that went away on its own. Pt tried flexeril without relief. Pt taking APAP and ibuprofen as well. No groin numbness or loss of control or bowel/bladder or leg weakness. No IVDU. No fevers. No blood thinner use. No DM hx. H/o PTSD, anxiety, depression. No dysuria or hematuria. Pushing to have BM makes pain worse. No hematuria. Pt denies concern for pregnancy.    Independent Historian   None    Past Medical History     Medical History and Problem List   Past Medical History:   Diagnosis Date    Alcohol abuse     Anxiety     Depressive disorder     PCOS (polycystic ovarian syndrome)     PTSD (post-traumatic stress disorder)     Suicidal ideation 02/05/2016       Medications   acetaminophen (TYLENOL) 500 MG tablet  gabapentin (NEURONTIN) 300 MG capsule  ibuprofen (ADVIL/MOTRIN) 200 MG tablet  methylPREDNISolone (MEDROL DOSEPAK) 4 MG tablet therapy pack  omeprazole (PRILOSEC) 40 MG DR capsule  acetaminophen (TYLENOL) 500 MG tablet  Acetaminophen-Pamabrom (MIDOL CAFFEINE FREE) 500-25 MG TABS  bisacodyl (DULCOLAX) 5 MG EC tablet  buPROPion (WELLBUTRIN SR) 150 MG 12 hr tablet  buPROPion (WELLBUTRIN XL) 300 MG 24 hr tablet  cholecalciferol (VITAMIN D3) 125 mcg (5000 units) capsule  cloNIDine (CATAPRES) 0.1 MG tablet  escitalopram (LEXAPRO) 10 MG tablet  hydrOXYzine (VISTARIL) 50 MG capsule  levonorgestrel (MIRENA) 20 MCG/DAY IUD  MELATONIN GUMMIES PO  mirtazapine (REMERON) 15 MG tablet  multivitamin w/minerals (THERA-VIT-M)  tablet  omeprazole (PRILOSEC) 20 MG DR capsule  polyethylene glycol (GOLYTELY) 236 g suspension  ramelteon (ROZEREM) 8 MG tablet  topiramate (TOPAMAX) 50 MG tablet        Surgical History   Past Surgical History:   Procedure Laterality Date    CHOLECYSTECTOMY      COLONOSCOPY N/A 5/25/2023    Procedure: COLONOSCOPY, WITH  BIOPSY;  Surgeon: Lorena Orellana MD;  Location: [facility] OR    cyst removed from ovary      DILATION AND CURETTAGE      GALLBLADDER SURGERY      HC REMOVAL OF OVARIAN CYST(S)      UPPER GI ENDOSCOPY  2015       Physical Exam     Patient Vitals for the past 24 hrs:   BP Temp Temp src Pulse Resp SpO2 Weight   10/04/24 1503 (!) 129/101 -- -- -- -- -- --   10/04/24 1502 -- 97.6  F (36.4  C) Temporal 70 16 97 % 102.1 kg (225 lb)     Physical Exam  VS: Reviewed per above  HENT: Mucous membranes moist, no nuchal rigidity  EYES: sclera anicteric  CV: Rate as noted,  regular rhythm.   RESP: Effort normal. Breath sounds are normal bilaterally.  GI: no tenderness/rebound/guarding, not distended.  NEURO: GCS 15, cranial nerves II through XII are intact, 5 out of 5 strength in all 4 extremities, sensation is intact light touch in all 4 extremities  MSK: No deformity of the extremities  SKIN: Warm and dry      ED Course      Medications Administered   Medications - No data to display    Procedures   Procedures     Discussion of Management   None    ED Course        Additional Documentation  None    Medical Decision Making / Diagnosis       None    WES     Veronique Oliva presented with back pain and radicular symptoms. The pain has improved with interventions in the emergency department.  She did not sustain any trauma, therefore x-rays/CT are not necessary due to the low likelihood of fracture or subluxation.     She has no back pain red flags on history or exam to suggest spinal cord compression syndrome (cauda equina syndrome, spinal/epidural space hematoma), spinal column infection (epidural  abscess, discitis, osteomyelitis), nor malignancy/metastatic disease. The neurological exam is normal.  Advanced imaging with CT/MRI is not indicated at this time, but may be indicated in the future if symptoms fail to resolve.     We considered other processes such as renal colic or UTI or pregnancy, but patient did not have urinary symptoms or concern for pregnancy.  We agreed to hold off on further laboratory/other testing at this juncture.    Veronique was advised that radiculopathy often takes significant time to resolve, and that follow up with primary care, neurology and/or neurosurgery will be indicated if symptoms do not improve.She will be discharged with gabapentin, tylenol and ibuprofen to use as directed, and  steroids to attempt to assist with nerve inflammation.  The patient was instructed to avoid heavy lifting, bending or twisting.  It was noted that the patient should return for increasing pain, muscular weakness, or bowel or bladder dysfunction.      Disposition   The patient was discharged.     Diagnosis     ICD-10-CM    1. Radicular low back pain  M54.10            Discharge Medications   Discharge Medication List as of 10/4/2024  3:46 PM        START taking these medications    Details   !! acetaminophen (TYLENOL) 500 MG tablet Take 2 tablets (1,000 mg) by mouth every 8 hours as needed for pain., Disp-60 tablet, R-0, E-Prescribe      gabapentin (NEURONTIN) 300 MG capsule Take 1 capsule (300 mg) by mouth 3 times daily as needed for neuropathic pain., Disp-30 capsule, R-0, E-Prescribe      ibuprofen (ADVIL/MOTRIN) 200 MG tablet Take 2 tablets (400 mg) by mouth every 8 hours as needed for pain., Disp-60 tablet, R-0, E-Prescribe      methylPREDNISolone (MEDROL DOSEPAK) 4 MG tablet therapy pack Follow Package Directions, Disp-21 tablet, R-0, E-Prescribe      !! omeprazole (PRILOSEC) 40 MG DR capsule Take 1 capsule (40 mg) by mouth daily., Disp-14 capsule, R-0, E-Prescribe       !! - Potential duplicate  medications found. Please discuss with provider.               Kory Ross MD  10/04/24 [id number]

## 2024-10-04 NOTE — ED TRIAGE NOTES
Pt presents to ed to be evaluated for back pain.  Pt states that Tuesday she had sudden onset of lower back pain while she was at dinner. Pt denies any injury or strain to her back. Pt states that she has been in bed the last couple of days. Pt reports dad has hx of back pain, and gave her flexeril, and pt states it hasn't helped.   Pt went to [facility] and they sent her here because they stated that they couldn't; access her insurance.   Pt denies loss of bowel and bladder.      Triage Assessment (Adult)       Row Name 10/04/24 1500          Triage Assessment    Airway WDL WDL        Respiratory WDL    Respiratory WDL WDL

## 2024-12-19 NOTE — TELEPHONE ENCOUNTER
[facility]   Patient is scheduled for a/an Colonoscopy (12090) with SuPrep, under MAC, with Debbi Cannon MD on 02/21/2025 at [facility] ([facility]) .      Prep instructions were mailed to patient.    Nurse:   Please send the procedure prep of Suprep and Zofran/Ondansetron (anti-nausea medication) to the patient's pharmacy and contact patient for any pre-procedure concerns or questions.     The patient already has the laxative and the anti nausea medication

## 2025-05-17 ENCOUNTER — HEALTH MAINTENANCE LETTER (OUTPATIENT)
Age: 35
End: 2025-05-17

## (undated) DEVICE — SPECIMEN CONTAINER 3OZ W/FORMALIN 59901

## (undated) DEVICE — SNARE CAPIVATOR ROUND COLD SNR BX10 M00561101

## (undated) DEVICE — SUCTION MANIFOLD NEPTUNE 2 SYS 1 PORT 702-025-000

## (undated) DEVICE — KIT ENDO TURNOVER/PROCEDURE CARRY-ON 101822

## (undated) DEVICE — SOL WATER IRRIG 500ML BOTTLE 2F7113

## (undated) DEVICE — GOWN IMPERVIOUS 2XL BLUE

## (undated) DEVICE — ENDO FORCEP BX CAPTURA PRO SPIKE G50696

## (undated) DEVICE — TUBING SUCTION MEDI-VAC 1/4"X20' N620A